# Patient Record
Sex: MALE | Race: BLACK OR AFRICAN AMERICAN | NOT HISPANIC OR LATINO | URBAN - METROPOLITAN AREA
[De-identification: names, ages, dates, MRNs, and addresses within clinical notes are randomized per-mention and may not be internally consistent; named-entity substitution may affect disease eponyms.]

---

## 2023-11-06 ENCOUNTER — INPATIENT (INPATIENT)
Facility: HOSPITAL | Age: 33
LOS: 1 days | Discharge: HOME HEALTH | DRG: 988 | End: 2023-11-08
Attending: STUDENT IN AN ORGANIZED HEALTH CARE EDUCATION/TRAINING PROGRAM | Admitting: STUDENT IN AN ORGANIZED HEALTH CARE EDUCATION/TRAINING PROGRAM
Payer: MEDICAID

## 2023-11-06 ENCOUNTER — EMERGENCY (EMERGENCY)
Facility: HOSPITAL | Age: 33
LOS: 1 days | Discharge: HOME | End: 2023-11-06
Attending: EMERGENCY MEDICINE | Admitting: EMERGENCY MEDICINE
Payer: MEDICAID

## 2023-11-06 VITALS
SYSTOLIC BLOOD PRESSURE: 130 MMHG | HEART RATE: 89 BPM | OXYGEN SATURATION: 99 % | TEMPERATURE: 98 F | WEIGHT: 154.98 LBS | RESPIRATION RATE: 18 BRPM | DIASTOLIC BLOOD PRESSURE: 100 MMHG

## 2023-11-06 VITALS
DIASTOLIC BLOOD PRESSURE: 59 MMHG | OXYGEN SATURATION: 98 % | SYSTOLIC BLOOD PRESSURE: 107 MMHG | HEART RATE: 70 BPM | TEMPERATURE: 99 F | RESPIRATION RATE: 18 BRPM

## 2023-11-06 VITALS
DIASTOLIC BLOOD PRESSURE: 55 MMHG | HEART RATE: 73 BPM | SYSTOLIC BLOOD PRESSURE: 127 MMHG | OXYGEN SATURATION: 97 % | RESPIRATION RATE: 18 BRPM | TEMPERATURE: 98 F

## 2023-11-06 DIAGNOSIS — N49.2 INFLAMMATORY DISORDERS OF SCROTUM: ICD-10-CM

## 2023-11-06 DIAGNOSIS — Z21 ASYMPTOMATIC HUMAN IMMUNODEFICIENCY VIRUS [HIV] INFECTION STATUS: ICD-10-CM

## 2023-11-06 DIAGNOSIS — M79.89 OTHER SPECIFIED SOFT TISSUE DISORDERS: ICD-10-CM

## 2023-11-06 DIAGNOSIS — L02.91 CUTANEOUS ABSCESS, UNSPECIFIED: ICD-10-CM

## 2023-11-06 DIAGNOSIS — Z29.9 ENCOUNTER FOR PROPHYLACTIC MEASURES, UNSPECIFIED: ICD-10-CM

## 2023-11-06 DIAGNOSIS — B20 HUMAN IMMUNODEFICIENCY VIRUS [HIV] DISEASE: ICD-10-CM

## 2023-11-06 LAB
ALBUMIN SERPL ELPH-MCNC: 3.5 G/DL — SIGNIFICANT CHANGE UP (ref 3.4–5)
ALBUMIN SERPL ELPH-MCNC: 3.5 G/DL — SIGNIFICANT CHANGE UP (ref 3.4–5)
ALP SERPL-CCNC: 65 U/L — SIGNIFICANT CHANGE UP (ref 40–120)
ALP SERPL-CCNC: 65 U/L — SIGNIFICANT CHANGE UP (ref 40–120)
ALT FLD-CCNC: 18 U/L — SIGNIFICANT CHANGE UP (ref 12–42)
ALT FLD-CCNC: 18 U/L — SIGNIFICANT CHANGE UP (ref 12–42)
ANION GAP SERPL CALC-SCNC: 9 MMOL/L — SIGNIFICANT CHANGE UP (ref 9–16)
ANION GAP SERPL CALC-SCNC: 9 MMOL/L — SIGNIFICANT CHANGE UP (ref 9–16)
APPEARANCE UR: CLEAR — SIGNIFICANT CHANGE UP
APPEARANCE UR: CLEAR — SIGNIFICANT CHANGE UP
APTT BLD: 31.3 SEC — SIGNIFICANT CHANGE UP (ref 24.5–35.6)
APTT BLD: 31.3 SEC — SIGNIFICANT CHANGE UP (ref 24.5–35.6)
AST SERPL-CCNC: 22 U/L — SIGNIFICANT CHANGE UP (ref 15–37)
AST SERPL-CCNC: 22 U/L — SIGNIFICANT CHANGE UP (ref 15–37)
BILIRUB DIRECT SERPL-MCNC: <0.1 MG/DL — SIGNIFICANT CHANGE UP (ref 0–0.3)
BILIRUB DIRECT SERPL-MCNC: <0.1 MG/DL — SIGNIFICANT CHANGE UP (ref 0–0.3)
BILIRUB INDIRECT FLD-MCNC: >0 MG/DL — LOW (ref 0.2–1)
BILIRUB INDIRECT FLD-MCNC: >0 MG/DL — LOW (ref 0.2–1)
BILIRUB SERPL-MCNC: 0.1 MG/DL — LOW (ref 0.2–1.2)
BILIRUB SERPL-MCNC: 0.1 MG/DL — LOW (ref 0.2–1.2)
BILIRUB UR-MCNC: NEGATIVE — SIGNIFICANT CHANGE UP
BILIRUB UR-MCNC: NEGATIVE — SIGNIFICANT CHANGE UP
BLD GP AB SCN SERPL QL: NEGATIVE — SIGNIFICANT CHANGE UP
BLD GP AB SCN SERPL QL: NEGATIVE — SIGNIFICANT CHANGE UP
BUN SERPL-MCNC: 9 MG/DL — SIGNIFICANT CHANGE UP (ref 7–23)
BUN SERPL-MCNC: 9 MG/DL — SIGNIFICANT CHANGE UP (ref 7–23)
CALCIUM SERPL-MCNC: 9.2 MG/DL — SIGNIFICANT CHANGE UP (ref 8.5–10.5)
CALCIUM SERPL-MCNC: 9.2 MG/DL — SIGNIFICANT CHANGE UP (ref 8.5–10.5)
CHLORIDE SERPL-SCNC: 105 MMOL/L — SIGNIFICANT CHANGE UP (ref 96–108)
CHLORIDE SERPL-SCNC: 105 MMOL/L — SIGNIFICANT CHANGE UP (ref 96–108)
CO2 SERPL-SCNC: 31 MMOL/L — SIGNIFICANT CHANGE UP (ref 22–31)
CO2 SERPL-SCNC: 31 MMOL/L — SIGNIFICANT CHANGE UP (ref 22–31)
COLOR SPEC: YELLOW — SIGNIFICANT CHANGE UP
COLOR SPEC: YELLOW — SIGNIFICANT CHANGE UP
CREAT SERPL-MCNC: 1.06 MG/DL — SIGNIFICANT CHANGE UP (ref 0.5–1.3)
CREAT SERPL-MCNC: 1.06 MG/DL — SIGNIFICANT CHANGE UP (ref 0.5–1.3)
CRP SERPL-MCNC: 20.1 MG/L — HIGH (ref 0–4)
CRP SERPL-MCNC: 20.1 MG/L — HIGH (ref 0–4)
DIFF PNL FLD: NEGATIVE — SIGNIFICANT CHANGE UP
DIFF PNL FLD: NEGATIVE — SIGNIFICANT CHANGE UP
EGFR: 96 ML/MIN/1.73M2 — SIGNIFICANT CHANGE UP
EGFR: 96 ML/MIN/1.73M2 — SIGNIFICANT CHANGE UP
ERYTHROCYTE [SEDIMENTATION RATE] IN BLOOD: 26 MM/HR — HIGH
ERYTHROCYTE [SEDIMENTATION RATE] IN BLOOD: 26 MM/HR — HIGH
GLUCOSE SERPL-MCNC: 95 MG/DL — SIGNIFICANT CHANGE UP (ref 70–99)
GLUCOSE SERPL-MCNC: 95 MG/DL — SIGNIFICANT CHANGE UP (ref 70–99)
GLUCOSE UR QL: NEGATIVE MG/DL — SIGNIFICANT CHANGE UP
GLUCOSE UR QL: NEGATIVE MG/DL — SIGNIFICANT CHANGE UP
GRAM STN FLD: ABNORMAL
HCT VFR BLD CALC: 38.7 % — LOW (ref 39–50)
HCT VFR BLD CALC: 38.7 % — LOW (ref 39–50)
HGB BLD-MCNC: 13.1 G/DL — SIGNIFICANT CHANGE UP (ref 13–17)
HGB BLD-MCNC: 13.1 G/DL — SIGNIFICANT CHANGE UP (ref 13–17)
INR BLD: 1.07 — SIGNIFICANT CHANGE UP (ref 0.85–1.18)
INR BLD: 1.07 — SIGNIFICANT CHANGE UP (ref 0.85–1.18)
KETONES UR-MCNC: NEGATIVE MG/DL — SIGNIFICANT CHANGE UP
KETONES UR-MCNC: NEGATIVE MG/DL — SIGNIFICANT CHANGE UP
LACTATE SERPL-SCNC: 0.6 MMOL/L — SIGNIFICANT CHANGE UP (ref 0.5–2)
LACTATE SERPL-SCNC: 0.6 MMOL/L — SIGNIFICANT CHANGE UP (ref 0.5–2)
LEUKOCYTE ESTERASE UR-ACNC: NEGATIVE — SIGNIFICANT CHANGE UP
LEUKOCYTE ESTERASE UR-ACNC: NEGATIVE — SIGNIFICANT CHANGE UP
MCHC RBC-ENTMCNC: 32 PG — SIGNIFICANT CHANGE UP (ref 27–34)
MCHC RBC-ENTMCNC: 32 PG — SIGNIFICANT CHANGE UP (ref 27–34)
MCHC RBC-ENTMCNC: 33.9 GM/DL — SIGNIFICANT CHANGE UP (ref 32–36)
MCHC RBC-ENTMCNC: 33.9 GM/DL — SIGNIFICANT CHANGE UP (ref 32–36)
MCV RBC AUTO: 94.6 FL — SIGNIFICANT CHANGE UP (ref 80–100)
MCV RBC AUTO: 94.6 FL — SIGNIFICANT CHANGE UP (ref 80–100)
NITRITE UR-MCNC: NEGATIVE — SIGNIFICANT CHANGE UP
NITRITE UR-MCNC: NEGATIVE — SIGNIFICANT CHANGE UP
NRBC # BLD: 0 /100 WBCS — SIGNIFICANT CHANGE UP (ref 0–0)
NRBC # BLD: 0 /100 WBCS — SIGNIFICANT CHANGE UP (ref 0–0)
PH UR: 5.5 — SIGNIFICANT CHANGE UP (ref 5–8)
PH UR: 5.5 — SIGNIFICANT CHANGE UP (ref 5–8)
PLATELET # BLD AUTO: 273 K/UL — SIGNIFICANT CHANGE UP (ref 150–400)
PLATELET # BLD AUTO: 273 K/UL — SIGNIFICANT CHANGE UP (ref 150–400)
POTASSIUM SERPL-MCNC: 3.4 MMOL/L — LOW (ref 3.5–5.3)
POTASSIUM SERPL-MCNC: 3.4 MMOL/L — LOW (ref 3.5–5.3)
POTASSIUM SERPL-SCNC: 3.4 MMOL/L — LOW (ref 3.5–5.3)
POTASSIUM SERPL-SCNC: 3.4 MMOL/L — LOW (ref 3.5–5.3)
PROT SERPL-MCNC: 7.8 G/DL — SIGNIFICANT CHANGE UP (ref 6.4–8.2)
PROT SERPL-MCNC: 7.8 G/DL — SIGNIFICANT CHANGE UP (ref 6.4–8.2)
PROT UR-MCNC: SIGNIFICANT CHANGE UP MG/DL
PROT UR-MCNC: SIGNIFICANT CHANGE UP MG/DL
PROTHROM AB SERPL-ACNC: 12.2 SEC — SIGNIFICANT CHANGE UP (ref 9.5–13)
PROTHROM AB SERPL-ACNC: 12.2 SEC — SIGNIFICANT CHANGE UP (ref 9.5–13)
RBC # BLD: 4.09 M/UL — LOW (ref 4.2–5.8)
RBC # BLD: 4.09 M/UL — LOW (ref 4.2–5.8)
RBC # FLD: 12.9 % — SIGNIFICANT CHANGE UP (ref 10.3–14.5)
RBC # FLD: 12.9 % — SIGNIFICANT CHANGE UP (ref 10.3–14.5)
RH IG SCN BLD-IMP: POSITIVE — SIGNIFICANT CHANGE UP
SODIUM SERPL-SCNC: 145 MMOL/L — SIGNIFICANT CHANGE UP (ref 132–145)
SODIUM SERPL-SCNC: 145 MMOL/L — SIGNIFICANT CHANGE UP (ref 132–145)
SP GR SPEC: >1.03 — HIGH (ref 1–1.03)
SP GR SPEC: >1.03 — HIGH (ref 1–1.03)
SPECIMEN SOURCE: SIGNIFICANT CHANGE UP
UROBILINOGEN FLD QL: 0.2 MG/DL — SIGNIFICANT CHANGE UP (ref 0.2–1)
UROBILINOGEN FLD QL: 0.2 MG/DL — SIGNIFICANT CHANGE UP (ref 0.2–1)
WBC # BLD: 7.27 K/UL — SIGNIFICANT CHANGE UP (ref 3.8–10.5)
WBC # BLD: 7.27 K/UL — SIGNIFICANT CHANGE UP (ref 3.8–10.5)
WBC # FLD AUTO: 7.27 K/UL — SIGNIFICANT CHANGE UP (ref 3.8–10.5)
WBC # FLD AUTO: 7.27 K/UL — SIGNIFICANT CHANGE UP (ref 3.8–10.5)

## 2023-11-06 PROCEDURE — 99285 EMERGENCY DEPT VISIT HI MDM: CPT

## 2023-11-06 PROCEDURE — 93010 ELECTROCARDIOGRAM REPORT: CPT

## 2023-11-06 PROCEDURE — 99223 1ST HOSP IP/OBS HIGH 75: CPT | Mod: GC

## 2023-11-06 PROCEDURE — 99053 MED SERV 10PM-8AM 24 HR FAC: CPT

## 2023-11-06 PROCEDURE — 72193 CT PELVIS W/DYE: CPT | Mod: 26

## 2023-11-06 RX ORDER — ACETAMINOPHEN 500 MG
650 TABLET ORAL EVERY 6 HOURS
Refills: 0 | Status: DISCONTINUED | OUTPATIENT
Start: 2023-11-06 | End: 2023-11-08

## 2023-11-06 RX ORDER — CIPROFLOXACIN LACTATE 400MG/40ML
400 VIAL (ML) INTRAVENOUS EVERY 12 HOURS
Refills: 0 | Status: DISCONTINUED | OUTPATIENT
Start: 2023-11-06 | End: 2023-11-08

## 2023-11-06 RX ORDER — IBUPROFEN 200 MG
600 TABLET ORAL EVERY 6 HOURS
Refills: 0 | Status: DISCONTINUED | OUTPATIENT
Start: 2023-11-06 | End: 2023-11-06

## 2023-11-06 RX ORDER — IBUPROFEN 200 MG
400 TABLET ORAL EVERY 6 HOURS
Refills: 0 | Status: DISCONTINUED | OUTPATIENT
Start: 2023-11-06 | End: 2023-11-08

## 2023-11-06 RX ORDER — METRONIDAZOLE 500 MG
500 TABLET ORAL ONCE
Refills: 0 | Status: DISCONTINUED | OUTPATIENT
Start: 2023-11-06 | End: 2023-11-09

## 2023-11-06 RX ORDER — BICTEGRAVIR SODIUM, EMTRICITABINE, AND TENOFOVIR ALAFENAMIDE FUMARATE 30; 120; 15 MG/1; MG/1; MG/1
1 TABLET ORAL DAILY
Refills: 0 | Status: DISCONTINUED | OUTPATIENT
Start: 2023-11-06 | End: 2023-11-06

## 2023-11-06 RX ORDER — SODIUM CHLORIDE 9 MG/ML
1000 INJECTION INTRAMUSCULAR; INTRAVENOUS; SUBCUTANEOUS ONCE
Refills: 0 | Status: COMPLETED | OUTPATIENT
Start: 2023-11-06 | End: 2023-11-06

## 2023-11-06 RX ORDER — METRONIDAZOLE 500 MG
500 TABLET ORAL ONCE
Refills: 0 | Status: COMPLETED | OUTPATIENT
Start: 2023-11-06 | End: 2023-11-06

## 2023-11-06 RX ORDER — BICTEGRAVIR SODIUM, EMTRICITABINE, AND TENOFOVIR ALAFENAMIDE FUMARATE 30; 120; 15 MG/1; MG/1; MG/1
1 TABLET ORAL EVERY 24 HOURS
Refills: 0 | Status: DISCONTINUED | OUTPATIENT
Start: 2023-11-06 | End: 2023-11-08

## 2023-11-06 RX ORDER — METRONIDAZOLE 500 MG
TABLET ORAL
Refills: 0 | Status: DISCONTINUED | OUTPATIENT
Start: 2023-11-06 | End: 2023-11-07

## 2023-11-06 RX ORDER — CEFTRIAXONE 500 MG/1
1000 INJECTION, POWDER, FOR SOLUTION INTRAMUSCULAR; INTRAVENOUS ONCE
Refills: 0 | Status: COMPLETED | OUTPATIENT
Start: 2023-11-06 | End: 2023-11-06

## 2023-11-06 RX ORDER — VANCOMYCIN HCL 1 G
VIAL (EA) INTRAVENOUS
Refills: 0 | Status: DISCONTINUED | OUTPATIENT
Start: 2023-11-06 | End: 2023-11-06

## 2023-11-06 RX ORDER — BICTEGRAVIR SODIUM, EMTRICITABINE, AND TENOFOVIR ALAFENAMIDE FUMARATE 30; 120; 15 MG/1; MG/1; MG/1
1 TABLET ORAL
Refills: 0 | DISCHARGE

## 2023-11-06 RX ORDER — VANCOMYCIN HCL 1 G
1000 VIAL (EA) INTRAVENOUS ONCE
Refills: 0 | Status: COMPLETED | OUTPATIENT
Start: 2023-11-06 | End: 2023-11-06

## 2023-11-06 RX ORDER — ACETAMINOPHEN 500 MG
1000 TABLET ORAL ONCE
Refills: 0 | Status: COMPLETED | OUTPATIENT
Start: 2023-11-06 | End: 2023-11-06

## 2023-11-06 RX ORDER — POTASSIUM CHLORIDE 20 MEQ
40 PACKET (EA) ORAL ONCE
Refills: 0 | Status: COMPLETED | OUTPATIENT
Start: 2023-11-06 | End: 2023-11-06

## 2023-11-06 RX ORDER — KETOROLAC TROMETHAMINE 30 MG/ML
15 SYRINGE (ML) INJECTION ONCE
Refills: 0 | Status: DISCONTINUED | OUTPATIENT
Start: 2023-11-06 | End: 2023-11-06

## 2023-11-06 RX ORDER — VANCOMYCIN HCL 1 G
1000 VIAL (EA) INTRAVENOUS EVERY 12 HOURS
Refills: 0 | Status: DISCONTINUED | OUTPATIENT
Start: 2023-11-07 | End: 2023-11-07

## 2023-11-06 RX ORDER — VALACYCLOVIR 500 MG/1
1000 TABLET, FILM COATED ORAL ONCE
Refills: 0 | Status: COMPLETED | OUTPATIENT
Start: 2023-11-06 | End: 2023-11-06

## 2023-11-06 RX ORDER — INFLUENZA VIRUS VACCINE 15; 15; 15; 15 UG/.5ML; UG/.5ML; UG/.5ML; UG/.5ML
0.5 SUSPENSION INTRAMUSCULAR ONCE
Refills: 0 | Status: DISCONTINUED | OUTPATIENT
Start: 2023-11-06 | End: 2023-11-08

## 2023-11-06 RX ORDER — METRONIDAZOLE 500 MG
500 TABLET ORAL EVERY 8 HOURS
Refills: 0 | Status: DISCONTINUED | OUTPATIENT
Start: 2023-11-06 | End: 2023-11-07

## 2023-11-06 RX ADMIN — Medication 1 TABLET(S): at 22:20

## 2023-11-06 RX ADMIN — Medication 40 MILLIEQUIVALENT(S): at 11:14

## 2023-11-06 RX ADMIN — Medication 200 MILLIGRAM(S): at 15:46

## 2023-11-06 RX ADMIN — Medication 1000 MILLIGRAM(S): at 11:56

## 2023-11-06 RX ADMIN — Medication 15 MILLIGRAM(S): at 12:57

## 2023-11-06 RX ADMIN — BICTEGRAVIR SODIUM, EMTRICITABINE, AND TENOFOVIR ALAFENAMIDE FUMARATE 1 TABLET(S): 30; 120; 15 TABLET ORAL at 22:20

## 2023-11-06 RX ADMIN — VALACYCLOVIR 1000 MILLIGRAM(S): 500 TABLET, FILM COATED ORAL at 05:53

## 2023-11-06 RX ADMIN — SODIUM CHLORIDE 1000 MILLILITER(S): 9 INJECTION INTRAMUSCULAR; INTRAVENOUS; SUBCUTANEOUS at 11:16

## 2023-11-06 RX ADMIN — Medication 400 MILLIGRAM(S): at 21:30

## 2023-11-06 RX ADMIN — Medication 250 MILLIGRAM(S): at 18:36

## 2023-11-06 RX ADMIN — Medication 100 MILLIGRAM(S): at 17:32

## 2023-11-06 RX ADMIN — Medication 15 MILLIGRAM(S): at 13:27

## 2023-11-06 RX ADMIN — Medication 100 MILLIGRAM(S): at 22:20

## 2023-11-06 RX ADMIN — CEFTRIAXONE 100 MILLIGRAM(S): 500 INJECTION, POWDER, FOR SOLUTION INTRAMUSCULAR; INTRAVENOUS at 08:59

## 2023-11-06 RX ADMIN — Medication 400 MILLIGRAM(S): at 20:26

## 2023-11-06 RX ADMIN — Medication 400 MILLIGRAM(S): at 11:16

## 2023-11-06 NOTE — H&P ADULT - PROBLEM SELECTOR PLAN 1
#Scrotal/intergluteal abscess   -Transferred from [facility] ED for scrotal/intergluteal abscesses.   -Reports severe pain, purulent drainage from perianal region as well as R scrotum for the past wekk.  Recently seen at [facility] last week, found to have scrotal lesion and sent home with outpatient  follow up which he was unable to do. Sexually active with men and women, engages in receptive anal intercourse.   -In the ED: Aferbile, no leukocytosis, UA negative for infection, non-toxic appearing. CT of pelvis shows:  Bilateral intergluteal fold abscesses. No soft tissue gas. Symmetric testicular size and enhancement. No  significant scrotal hydrocele.   -s/p IV ctx/flagyl (at [facility]), bedside I&D performed for scrotal and gluteal abscesses by urology/surgery    Plan:   -f/u blood cultures  -f/u wound cultures #Scrotal/intergluteal abscess   -Transferred from [facility] ED for scrotal/intergluteal abscesses.   -Reports severe pain, purulent drainage from perianal region as well as R scrotum for the past wekk.  Recently seen at [facility] last week, found to have scrotal lesion and sent home with outpatient  follow up which he was unable to do. Sexually active with men and women, engages in receptive anal intercourse.   -In the ED: Aferbile, no leukocytosis, UA negative for infection, non-toxic appearing. CT of pelvis shows:  Bilateral intergluteal fold abscesses. Additional finding of Right scrotal wall abscess, No soft tissue gas. Symmetric testicular size and enhancement. No  significant scrotal hydrocele.   -s/p IV ctx/flagyl (at [facility]), bedside I&D performed for scrotal and gluteal abscesses by urology/surgery    Plan:   -f/u blood cultures  -f/u wound cultures #Scrotal/intergluteal abscess   -Transferred from [facility] ED for scrotal/intergluteal abscesses.   -Reports severe pain, purulent drainage from perianal region as well as R scrotum for the past week.  Recently seen at [facility] last week, found to have scrotal lesion and sent home with outpatient  follow up which he was unable to do. Sexually active with men and women, engages in receptive anal intercourse.   -In the ED: Aferbile, no leukocytosis, UA negative for infection, non-toxic appearing. CT of pelvis shows:  Bilateral intergluteal fold abscesses. Additional finding of Right scrotal wall abscess, No soft tissue gas.   -s/p IV ctx/flagyl (at [facility]), bedside I&D performed for scrotal and gluteal abscesses by urology/surgery    Plan:   -Ciprofloxacin 400mg IV BID (to cover gram-negative)  -Metronidazole 500mg IV q8hr (to cover anaerobes)  -Vancomycin (to cover for MRSA given purulence)   -Pain control with Tylenol 650mg P.O PRN   -f/u blood cultures  -f/u wound cultures #Scrotal/intergluteal abscess   -Transferred from [facility] ED for scrotal/intergluteal abscesses.   -Reports severe pain, purulent drainage from perianal region as well as R scrotum for the past week.  Recently seen at [facility] last week, found to have scrotal lesion and sent home with outpatient  follow up which he was unable to do. Sexually active with men and women, engages in receptive anal intercourse. Denies recent sexual activity, trauma to the groin   -In the ED: Aferbile, no leukocytosis, UA negative for infection, non-toxic appearing. CT of pelvis shows:  Bilateral intergluteal fold abscesses. Additional finding of Right scrotal wall abscess, No soft tissue gas.   -s/p IV ctx/flagyl (at [facility]), bedside I&D performed for scrotal and gluteal abscesses by urology/surgery    Plan:   -Ciprofloxacin 400mg IV BID (to cover gram-negative)  -Metronidazole 500mg IV q8hr (to cover anaerobes)  -Vancomycin (to cover for MRSA given purulence)   -Pain control with Tylenol 650mg P.O PRN   -f/u blood cultures  -f/u wound cultures #Scrotal/intergluteal abscess   -Transferred from [facility] ED for scrotal/intergluteal abscesses.   -Reports severe pain, purulent drainage from perianal region as well as R scrotum for the past week.  Recently seen at [facility] last week, found to have scrotal lesion and sent home with outpatient  follow up which he was unable to do. Sexually active with men and women, engages in receptive anal intercourse. Denies recent sexual activity, trauma to the groin   -In the ED: Aferbile, no leukocytosis, UA negative for infection, non-toxic appearing. CT of pelvis shows:  Bilateral intergluteal fold abscesses. Additional finding of Right scrotal wall abscess, No soft tissue gas.   -s/p IV ctx/flagyl (at [facility]), bedside I&D performed for scrotal and gluteal abscesses by urology/surgery    Plan:   -Ciprofloxacin 400mg IV BID (to cover gram-negative)  -Metronidazole 500mg IV q8hr (to cover anaerobes)  -Vancomycin 15mg/kg 1000mg IV BID (to cover for MRSA given purulence)   -Pain control with Tylenol 650mg P.O PRN   -f/u blood cultures  -f/u wound cultures #Scrotal/intergluteal abscess   -Transferred from [facility] ED for scrotal/intergluteal abscesses.   -Reports severe pain, purulent drainage from perianal region as well as R scrotum for the past week.  Recently seen at [facility] last week, found to have scrotal lesion and sent home with outpatient  follow up which he was unable to do. Sexually active with men and women, engages in receptive anal intercourse. Denies recent sexual activity, trauma to the groin   -In the ED: Aferbile, no leukocytosis, UA negative for infection, non-toxic appearing. CT of pelvis shows:  Bilateral intergluteal fold abscesses. Additional finding of Right scrotal wall abscess, No soft tissue gas.   -s/p IV ctx/flagyl (at [facility]), bedside I&D performed for scrotal and gluteal abscesses by urology/surgery    Plan:   -Ciprofloxacin 400mg IV BID (to cover gram-negative)  -Metronidazole 500mg IV q8hr (to cover anaerobes)  -Vancomycin 15mg/kg 1000mg IV BID (to cover for MRSA given purulence)   -Pain control with Tylenol 650mg P.O PRN   -f/u blood cultures  -f/u wound cultures  -f/u ESR/CRP #Scrotal/intergluteal abscess   -Transferred from [facility] ED for scrotal/intergluteal abscesses.   -Reports severe pain, purulent drainage from perianal region as well as R scrotum for the past week.  Recently seen at [facility] last week, found to have scrotal lesion and sent home with outpatient  follow up which he was unable to do. Sexually active with men and women, engages in receptive anal intercourse. Denies recent sexual activity, trauma to the groin   -In the ED: Aferbile, no leukocytosis, UA negative for infection, non-toxic appearing. CT of pelvis shows:  Bilateral intergluteal fold abscesses. Additional finding of Right scrotal wall abscess, No soft tissue gas.   -s/p IV ctx/flagyl (at [facility]), bedside I&D performed for scrotal and gluteal abscesses by urology/surgery. As per surgery: No further general surgery procedure planned     Plan:   -Ciprofloxacin 400mg IV BID (to cover gram-negative)  -Metronidazole 500mg IV q8hr (to cover anaerobes)  -Vancomycin 15mg/kg 1000mg IV BID (to cover for MRSA given purulence)   -Pain control with Tylenol 650mg P.O PRN   -f/u blood cultures  -f/u wound cultures  -Sitz baths  -f/u surgery  and urology recs   -f/u ESR/CRP #Scrotal/intergluteal abscess   -Transferred from [facility] ED for scrotal/intergluteal abscesses.   -Reports severe pain, purulent drainage from perianal region as well as R scrotum for the past week.  Recently seen at [facility] last week, found to have scrotal lesion and sent home with outpatient  follow up which he was unable to do. Sexually active with men and women, engages in receptive anal intercourse. Denies recent sexual activity, trauma to the groin   -In the ED: Aferbile, no leukocytosis, UA negative for infection, non-toxic appearing. CT of pelvis shows:  Bilateral intergluteal fold abscesses. Additional finding of Right scrotal wall abscess, No soft tissue gas.   -s/p IV ctx/flagyl (at [facility]), bedside I&D performed for scrotal and gluteal abscesses by urology/surgery. As per surgery: No further general surgery procedure planned     Plan:   -Ciprofloxacin 400mg IV BID (to cover gram-negative)  -Metronidazole 500mg IV q8hr (to cover anaerobes)  -Vancomycin 15mg/kg 1000mg IV BID (to cover for MRSA given purulence)   -Pain control with Tylenol 650mg P.O PRN for mild and moderate pain, Ibuprofen 600mg P.O q6 for severe pain  -f/u blood cultures  -f/u wound cultures  -Flores baths  -f/u surgery  and urology recs   -f/u ESR/CRP

## 2023-11-06 NOTE — ED PROVIDER NOTE - CLINICAL SUMMARY MEDICAL DECISION MAKING FREE TEXT BOX
33 year old male with history of HIV on Biktarvy (reports last viral load undetectable) presenting as transfer from [facility] ED for scrotal/intergluteal abscesses. 33 year old male with history of HIV on Biktarvy (reports last viral load undetectable) presenting as transfer from [facility] ED for scrotal/intergluteal abscesses x ~1 wk, overall nontoxic/afebrile here, vitals wnl, labs reviewed from OSH, overall reassuring/no leukocytosis. Will send remainder of STD screen given high risk. No clinical evidence of fourniers vs nec fasc. Surgery/urology to eval for likely drainage of abscesses.

## 2023-11-06 NOTE — H&P ADULT - PROBLEM SELECTOR PLAN 3
F: None  Replete: Mg<2, Ph<3, K<4  Diet: Regular  Code status: Full Code  Dispo: [facility]

## 2023-11-06 NOTE — PROCEDURE NOTE - NSINFORMCONSENT_GEN_A_CORE
in conjunction with URO/Benefits, risks, and possible complications of procedure explained to patient/caregiver who verbalized understanding and gave verbal consent.

## 2023-11-06 NOTE — ED PROVIDER NOTE - PHYSICAL EXAMINATION
Gen - NAD; well-appearing; A+Ox3   HEENT - NCAT, EOMI  Neck - supple  Resp - CTAB, no increased WOB  CV -  RRR, no m/r/g  Abd - soft, NT, ND; no guarding or rebound  Extrem - no LE edema  Neuro - no focal motor or sensation deficits  Skin - warm, well perfused   - +R scrotal wall abscess, tender to touch, purulent, no crepitus; +multiple intergluteal fold abscesses with fluctuance/purulence, tender to touch, no crepitus

## 2023-11-06 NOTE — CONSULT NOTE ADULT - SUBJECTIVE AND OBJECTIVE BOX
SURGERY CONSULT  ==============================================================================================================  HPI: 33 year old male with history of HIV on Biktarvy (reports last viral load undetectable) presenting as transfer from [facility] ED for scrotal/intergluteal abscesses. States for past week he has had severe pain and purulent drainage from perianal region as well as R scrotum--denies fevers or chills. Denies any abscesses or drainage procedures in the past. Denies any change in bowel movements.     In the ED patient seen with complaints of buttock pain. Vital signs within normal limits, afebrile, saturating appropriately RA.   Rectal exam shows purulent drainage, mostly from left sided abscess in the intergluteal fold, some erythema noted. Right side also presents with a pustule with overlying fluctuance, no drainage, exquisitely tender to palpation. Multiple anal skin tags evidenced. No internal or external hemorrhoids.   CT pelvis shows Bilateral intergluteal fold abscesses, on the right 3.0 x 1.5 x 4.0 cm, on the left 2.2 x 1.5 x 2.0 cm. No soft tissue gas. Right scrotal wall abscess 2.0 x 1.6 x 2.0cm  General surgery consulted for evaluation for incision and drainage of bilateral intergluteal abscesses.       PAST MEDICAL & SURGICAL HISTORY:  HIV disease  Herpes virus diseas  Home Meds: Home Medications:  Allergies: Allergies  No Known Allergies  Intolerances      Soc:   Advanced Directives: Presumed Full Code     CURRENT MEDICATIONS:   --------------------------------------------------------------------------------------  Neurologic Medications  ketorolac   Injectable 15 milliGRAM(s) IV Push Once    Respiratory Medications    Cardiovascular Medications    Gastrointestinal Medications    Genitourinary Medications    Hematologic/Oncologic Medications    Antimicrobial/Immunologic Medications  doxycycline IVPB 100 milliGRAM(s) IV Intermittent once    Endocrine/Metabolic Medications    Topical/Other Medications    --------------------------------------------------------------------------------------    VITAL SIGNS, INS/OUTS (last 24 hours):  --------------------------------------------------------------------------------------  ICU Vital Signs Last 24 Hrs  T(C): 36.7 (06 Nov 2023 10:06), Max: 37.2 (06 Nov 2023 09:05)  T(F): 98 (06 Nov 2023 10:06), Max: 98.9 (06 Nov 2023 09:05)  HR: 73 (06 Nov 2023 10:06) (70 - 89)  BP: 127/55 (06 Nov 2023 10:06) (107/59 - 130/100)  BP(mean): --  ABP: --  ABP(mean): --  RR: 18 (06 Nov 2023 10:06) (18 - 18)  SpO2: 97% (06 Nov 2023 10:06) (97% - 99%)    O2 Parameters below as of 06 Nov 2023 10:06  Patient On (Oxygen Delivery Method): room air    I&O's Summary    --------------------------------------------------------------------------------------    EXAM:  CONSTITUTIONAL: Awake, alert.  Nontoxic, no acute distress.  HEAD: Normocephalic, atraumatic.  EYES: Conjunctivae clear without exudates or hemorrhage. Sclera is non-icteric.  ENT: Normal appearing external ears, nose, mucous membranes moist.  NECK: supple, trachea midline.  HEART:  Normal rate, regular rhythm.    LUNGS:  No acute respiratory distress.  Non-tachypneic and non-labored.  ABDOMEN: Soft, non-distended.  No rebound or guarding. No hernias or masses palpable.  No pulsatile abdominal mass.   No CVA tenderness b/l.  MUSCULOSKELETAL:  Moving all extremities without issue.  SKIN: Skin in warm, dry and intact without rashes or lesions.  Appropriate color for ethnicity.  Rectal: Rectal exam shows purulent drainage, mostly from left sided abscess in the intergluteal fold, some erythema noted. Right side also presents with a pustule with overlying fluctuance, no drainage, exquisitely tender to palpation. Multiple anal skin tags evidenced. No internal or external hemorrhoids.   NEUROLOGICAL:  Patient is alert, oriented x person, place and time.  PSYCH: Appropriate mood and affect. Good judgment and insight.]    LABS  --------------------------------------------------------------------------------------  Labs:  CAPILLARY BLOOD GLUCOE             13.1   7.27  )-----------( 273      ( 06 Nov 2023 05:35 )             38.7         11-06    145  |  105  |  9   ----------------------------<  95  3.4<L>   |  31  |  1.06      Calcium: 9.2 mg/dL (11-06-23 @ 05:35)      LFTs:     Lactate, Blood: 0.6 mmol/L (11-06-23 @ 10:10)      Coags:     12.2   ----< 1.07    ( 06 Nov 2023 10:09 )     31.3        Urinalysis Basic - ( 06 Nov 2023 10:36 )    Color: Yellow / Appearance: Clear / SG: >1.030 / pH: x  Gluc: x / Ketone: Negative mg/dL  / Bili: Negative / Urobili: 0.2 mg/dL   Blood: x / Protein: Trace mg/dL / Nitrite: Negative   Leuk Esterase: Negative / RBC: x / WBC x   Sq Epi: x / Non Sq Epi: x / Bacteria: x        Urinalysis with Rflx Culture (collected 06 Nov 2023 10:36)    --------------------------------------------------------------------------------------    OTHER LABS    IMAGING RESULTS      ACC: [id number] EXAM:  CT PELVIS ONLY IC   ORDERED BY: PALUO ALICEA     *** ADDENDUM # 1 ***    ADDENDUM: Additional finding of Right scrotal wall abscess 2.0 x 1.6 x   2.0 cm, correlates to patient's signs and symptoms upon discussion with   Dr. PAULO ALICEA 11/6/2023 7:03 AM.    --- End of Report ---    *** END OF ADDENDUM # 1 ***      PROCEDURE DATE:  11/06/2023          INTERPRETATION:  CLINICAL INFORMATION: Scrotal pain and swelling    COMPARISON: None.    CONTRAST/COMPLICATIONS:  IV Contrast: Omnipaque 350  100 cc administered   0 cc discarded  Oral Contrast: NONE  Complications: None reported at time of study completion    PROCEDURE:  CT of the Pelvis was performed.  Sagittal and coronal reformats were performed.    FINDINGS:  BLADDER: Within normal limits.  REPRODUCTIVE ORGANS: Symmetric testicular size and enhancement. No   significant scrotal hydrocele. Unremarkable prostate.  LYMPH NODES: No pelvic lymphadenopathy.    VISUALIZED PORTIONS:  ABDOMINAL ORGANS: Within normal limits.  BOWEL: Within normal limits.  PERITONEUM: No ascites.  VESSELS: Within normal limits.  ABDOMINAL WALL: Bilateral intergluteal fold abscesses, on the right 3.0 x   1.5 x 4.0 cm, on the left 2.2 x 1.5 x 2.0 cm. No soft tissue gas.  BONES: Within normal limits.    IMPRESSION:  Bilateral intergluteal fold abscesses. Pelvic MRI could be obtained to   exclude underlying possibility of perianal fistula, if clinically   warranted.    ASSESSMENT/ PLAN:  33 year old male with history of HIV on Biktarvy (reports last viral load undetectable) presenting as transfer from [facility] ED for scrotal/intergluteal abscesses. As evidenced on CT scan bilateral intergluteal fold abscesses, on the right 3.0 x   1.5 x 4.0 cm, on the left 2.2 x 1.5 x 2.0 cm. No soft tissue gas.    Incision and drainage procedure performed bedside in the ED over the right sided abscess yielded 4-5 cc of purulent material, packed with sterile packing strips. Left sided abscess was found to be draining from multiple sinuses. Urology also performed Scrotal ID.  Cultures obtained. Pain control provided as well as antibiotics.     Plan     No further general surgery procedure planned   F/u cultures to rule out possible MRSA infection   Patient would most likely benefit from admission for IV antibiotics in the setting of HIV disease and multiple abscesses   Ensure adequate pain control   Team 4c to follow, please do not hesitate to call if any further questions or concerns   Attending Dr. Mesa aware and agrees with plan
33 year old male with history of HIV on Biktarvy (reports last viral load undetectable) presenting as transfer from [facility] ED for scrotal/intergluteal abscesses. States for past week he has had severe pain and purulent drainage from perianal region as well as R scrotum--denies fevers or chills. Sexually active with men and women, engages in receptive anal intercourse. Per pt, he was seen at [facility] last week, found to have scrotal lesion and sent home with outpatient  follow up which he was unable to do.      resident performed bedside scrotal incision and drainage using sterile technique. Scrotal wound cx sent x 2. packed and left open      Vital Signs Last 24 Hrs  T(C): 36.7 (06 Nov 2023 10:06), Max: 37.2 (06 Nov 2023 09:05)  T(F): 98 (06 Nov 2023 10:06), Max: 98.9 (06 Nov 2023 09:05)  HR: 73 (06 Nov 2023 10:06) (70 - 89)  BP: 127/55 (06 Nov 2023 10:06) (107/59 - 130/100)  BP(mean): --  RR: 18 (06 Nov 2023 10:06) (18 - 18)  SpO2: 97% (06 Nov 2023 10:06) (97% - 99%)    Parameters below as of 06 Nov 2023 10:06  Patient On (Oxygen Delivery Method): room air      I&O's Summary      PE:  Gen: awake and alert  Abd: nontender, nondistended  : no suprapubic/CVAT  R scrotum: + erythematous. + fluctuance + purulent drainage + exquisitely tender  L scrotum: wnl  no penile drainage, swelling, erythema     LABS:                        13.1   7.27  )-----------( 273      ( 06 Nov 2023 05:35 )             38.7     11-06    145  |  105  |  9   ----------------------------<  95  3.4<L>   |  31  |  1.06    Ca    9.2      06 Nov 2023 05:35      PT/INR - ( 06 Nov 2023 10:09 )   PT: 12.2 sec;   INR: 1.07          PTT - ( 06 Nov 2023 10:09 )  PTT:31.3 sec  Cultures      ADDENDUM: Additional finding of Right scrotal wall abscess 2.0 x 1.6 x   2.0 cm, correlates to patient's signs and symptoms upon discussion with   Dr. PAULO ALICEA 11/6/2023 7:03 AM.    --- End of Report ---    *** END OF ADDENDUM # 1 ***      PROCEDURE DATE:  11/06/2023          INTERPRETATION:  CLINICAL INFORMATION: Scrotal pain and swelling    COMPARISON: None.    CONTRAST/COMPLICATIONS:  IV Contrast: Omnipaque 350  100 cc administered   0 cc discarded  Oral Contrast: NONE  Complications: None reported at time of study completion    PROCEDURE:  CT of the Pelvis was performed.  Sagittal and coronal reformats were performed.    FINDINGS:  BLADDER: Within normal limits.  REPRODUCTIVE ORGANS: Symmetric testicular size and enhancement. No   significant scrotal hydrocele. Unremarkable prostate.  LYMPH NODES: No pelvic lymphadenopathy.    VISUALIZED PORTIONS:  ABDOMINAL ORGANS: Within normal limits.  BOWEL: Within normal limits.  PERITONEUM: No ascites.  VESSELS: Within normal limits.  ABDOMINAL WALL: Bilateral intergluteal fold abscesses, on the right 3.0 x   1.5 x 4.0 cm, on the left 2.2 x 1.5 x 2.0 cm. No soft tissue gas.  BONES: Within normal limits.    IMPRESSION:  Bilateral intergluteal fold abscesses. Pelvic MRI could be obtained to   exclude underlying possibility of perianal fistula, if clinically   warranted.

## 2023-11-06 NOTE — H&P ADULT - HISTORY OF PRESENT ILLNESS
33 year old male with history of HIV on Biktarvy (reports last viral load undetectable) presenting as transfer from [facility] ED for scrotal/intergluteal abscesses. States for past week he has had severe pain and purulent drainage from perianal region as well as R scrotum.  Of note, pt reports he was recently seen at [facility] last week, found to have scrotal lesion and sent home with outpatient  follow up which he was unable to do.Pt is  Sexually active with men and women, engages in receptive anal intercourse.  Pt denies fevers or chills.   Found at OSH to have R scrotal abscess as well as intergluteal fold abscesses on CT pelvis, given IV ctx/flagyl, sent to  ED for further eval/management. In the ED, patient seen by surgery and urology--bedside I&D performed for scrotal and gluteal abscesses    In the ED:  VS: T 98, HR 73, /55, RR 18, SPO2 97%  Labs:WBC 7.27. Hgb 13.1, Plt 273, Na 145, K 3.4, Bicarb 31, AG 9, BUN 9, creatinine 1.06, UA negative for LE, Nitrite   Imaging: CT pelvis: Bilateral intergluteal fold abscesses, on the right 3.0 x   1.5 x 4.0 cm, on the left 2.2 x 1.5 x 2.0 cm. No soft tissue gas. Pelvic MRI could be obtained to  exclude underlying possibility of perianal fistula, if clinically   warranted.  Interventions:   Acetaminophen 1g IV, Ketorocol 15mg IV, KCl 40mEq P.O x1, NS 1L IV  Consults: Patient seen by surgery and urology--bedside I&D performed for scrotal and gluteal abscesses 33 year old male with history of HIV on Biktarvy (reports last viral load undetectable) presenting as transfer from [facility] ED for scrotal/intergluteal abscesses. States for past week he has had severe pain and purulent drainage from perianal region as well as R scrotum.  Of note, pt reports he was recently seen at [facility] last week, found to have scrotal lesion and sent home with outpatient  follow up which he was unable to do.Pt is  Sexually active with men and women, engages in receptive anal intercourse.  Pt denies fevers or chills.   Found at OSH to have R scrotal abscess as well as intergluteal fold abscesses on CT pelvis, given IV ctx/flagyl, sent to  ED for further eval/management. In the ED, patient seen by surgery and urology--bedside I&D performed for scrotal and gluteal abscesses    In the ED:  VS: T 98, HR 73, /55, RR 18, SPO2 97%  Labs:WBC 7.27. Hgb 13.1, Plt 273, Na 145, K 3.4, Bicarb 31, AG 9, BUN 9, creatinine 1.06, UA negative for LE, Nitrite   Imaging: CT pelvis: Bilateral intergluteal fold abscesses, on the right 3.0 x   1.5 x 4.0 cm, on the left 2.2 x 1.5 x 2.0 cm. Additional finding of Right scrotal wall abscess 2.0 x 1.6 x   2.0 cm, No soft tissue gas. Pelvic MRI could be obtained to  exclude underlying possibility of perianal fistula, if clinically   warranted.  Interventions:   Acetaminophen 1g IV, Ketorocol 15mg IV, KCl 40mEq P.O x1, NS 1L IV  Consults: Patient seen by surgery and urology--bedside I&D performed for scrotal and gluteal abscesses 33 year old male with history of HIV on Biktarvy (reports last viral load undetectable) presenting as transfer from [facility] ED for scrotal/intergluteal abscesses. States for past week he has had severe pain and purulent drainage from perianal region as well as R scrotum.  Of note, pt reports he was recently seen at [facility] last week, found to have scrotal lesion and sent home with outpatient  follow up which he was unable to do. Reports he did not receive antibiotics at this time, and was not prescribed any medications. Pt is  Sexually active with men and women, engages in receptive anal intercourse. Denies recent sexual activity, denies trauma to the groin area or prior hx of infections in the groin area. Patient endorses night sweats over the last week. On ROS  denies fevers or chills, nausea, vomiting, suprapubic pain, dysuria, hematuria, hematochezia, diarrhea.   Pt initially presented at [facility], found to have R scrotal abscess as well as intergluteal fold abscesses on CT pelvis, given IV ctx/flagyl, sent to  ED for further eval/management. In the ED, patient seen by surgery and urology--bedside I&D performed for scrotal and gluteal abscesses    In the ED:  VS: T 98, HR 73, /55, RR 18, SPO2 97%  Labs:WBC 7.27. Hgb 13.1, Plt 273, Na 145, K 3.4, Bicarb 31, AG 9, BUN 9, creatinine 1.06, UA negative for LE, Nitrite   Imaging: CT pelvis: Bilateral intergluteal fold abscesses, on the right 3.0 x 1.5 x 4.0 cm, on the left 2.2 x 1.5 x 2.0 cm. Additional finding of Right scrotal wall abscess 2.0 x 1.6 x   2.0 cm, No soft tissue gas. Pelvic MRI could be obtained to  exclude underlying possibility of perianal fistula, if clinically warranted.  Interventions:   Acetaminophen 1g IV, Ketorocol 15mg IV, KCl 40mEq P.O x1, NS 1L IV  Consults: Patient seen by surgery and urology--bedside I&D performed for scrotal and gluteal abscesses

## 2023-11-06 NOTE — ED PROVIDER NOTE - PROGRESS NOTE DETAILS
Zia Moser MD: Patient seen by surgery and urology--bedside I&D performed for scrotal and gluteal abscesses--will admit for IV abx to include MRSA coverage and pain control

## 2023-11-06 NOTE — ED ADULT NURSE REASSESSMENT NOTE - NS ED NURSE REASSESS COMMENT FT1
Charge RN received report from [facility]. pt transferred from [facility] with EMS. a/ox4, VSS. received with Rocephin infusing. Safety maintained, all needs met.

## 2023-11-06 NOTE — H&P ADULT - NSHPLABSRESULTS_GEN_ALL_CORE
.  LABS:                         13.1   7.27  )-----------( 273      ( 06 Nov 2023 05:35 )             38.7     11-06    145  |  105  |  9   ----------------------------<  95  3.4<L>   |  31  |  1.06    Ca    9.2      06 Nov 2023 05:35      PT/INR - ( 06 Nov 2023 10:09 )   PT: 12.2 sec;   INR: 1.07          PTT - ( 06 Nov 2023 10:09 )  PTT:31.3 sec  Urinalysis Basic - ( 06 Nov 2023 10:36 )    Color: Yellow / Appearance: Clear / SG: >1.030 / pH: x  Gluc: x / Ketone: Negative mg/dL  / Bili: Negative / Urobili: 0.2 mg/dL   Blood: x / Protein: Trace mg/dL / Nitrite: Negative   Leuk Esterase: Negative / RBC: x / WBC x   Sq Epi: x / Non Sq Epi: x / Bacteria: x            Lactate, Blood: 0.6 mmol/L (11-06 @ 10:10)      RADIOLOGY, EKG & ADDITIONAL TESTS: Reviewed.

## 2023-11-06 NOTE — ED ADULT NURSE NOTE - OBJECTIVE STATEMENT
33 y.o. Male a/ox4 transferred via ambulance from [facility] c/o testicular pain. Dx scrotal & gluteal abscess. Was supposed to follow up outpatient with  MD but was unable to do so. Arrived with L #20G IV In AC with IV Rocephin in progress. Denies CP, SOB, fevers/chills, abd pain nvd, headaches, syncope.

## 2023-11-06 NOTE — ED PROVIDER NOTE - PHYSICAL EXAMINATION
VITAL SIGNS: I have reviewed nursing notes and confirm.  CONSTITUTIONAL: Well-developed; well-nourished; in no acute distress.  HEAD: Normocephalic; atraumatic.  EYES: EOM intact; conjunctiva and sclera clear.  ENT: nose appears normal  ABD: soft; non-distended; non-tender  : focal swelling of the right scrotum with evidence of recent drainage. small ulcerations to the perianal region with serous drainage  PSYCH: Cooperative, appropriate.

## 2023-11-06 NOTE — H&P ADULT - ASSESSMENT
33 year old male with history of HIV on Biktarvy (reports last viral load undetectable) presenting as transfer from [facility] ED with 1 week hx of pain and drainage in the groin area found to have scrotal/intergluteal abscesses admitted for I&D and treatment with IV antibiotics

## 2023-11-06 NOTE — H&P ADULT - PROBLEM SELECTOR PLAN 2
Reports history of HIV on Biktarvy (reports last viral load undetectable)    Plan:   -f/u viral load, CD4 count  -continue with Bictarvy Reports history of HIV on Biktarvy (reports last viral load undetectable), Home meds (Per med rec): Biktarvy 50/200/25 1tab daily and Bactrim 800/160 1 tab BID. Unclear indication for Bactrim BID, unable to reach the prescribing provider    Plan:   -f/u viral load, CD4 count  -continue with Bictarvy 1 tab daily   -continue with Bactrim 1 tab daily   -obtain collateral from providing provider

## 2023-11-06 NOTE — ED PROVIDER NOTE - OBJECTIVE STATEMENT
33 year old male with history of HIV on Biktarvy (reports last viral load undetectable) presenting as transfer from [facility] ED for scrotal/intergluteal abscesses. States for past week he has had severe pain and purulent drainage from perianal region as well as R scrotum--denies fevers or chills. Sexually active with men and women, engages in receptive anal intercourse. Found @ OSH to have R scrotal abscess as well as intergluteal fold abscesses on CT pelvis, given IV ctx/flagyl, sent here for further eval/management.

## 2023-11-06 NOTE — ED ADULT NURSE NOTE - CHIEF COMPLAINT QUOTE
BIBEMS as a transfer from [facility] d/t R sided scrotal abscess. Pt denies complaints upon arrival to ED.

## 2023-11-06 NOTE — ED PROVIDER NOTE - CLINICAL SUMMARY MEDICAL DECISION MAKING FREE TEXT BOX
33-year-old male with a history of HIV presenting with scrotal swelling concerning for possible abscess as well as evidence of a herpes outbreak to the perianal region.  CT scan of the pelvis was obtained for further evaluation and to rule out foreign years.  This appears to show a focal abscess of the right scrotum.  Treated with a dose of valacyclovir for the lesions to the perianal area.  Will arrange for transfer to [facility] to be evaluated by urology for possible drainage of the scrotal abscess.

## 2023-11-06 NOTE — H&P ADULT - NSHPPHYSICALEXAM_GEN_ALL_CORE
.  VITAL SIGNS:  T(C): 36.7 (11-06-23 @ 10:06), Max: 37.2 (11-06-23 @ 09:05)  T(F): 98 (11-06-23 @ 10:06), Max: 98.9 (11-06-23 @ 09:05)  HR: 73 (11-06-23 @ 10:06) (70 - 89)  BP: 127/55 (11-06-23 @ 10:06) (107/59 - 130/100)  BP(mean): --  RR: 18 (11-06-23 @ 10:06) (18 - 18)  SpO2: 97% (11-06-23 @ 10:06) (97% - 99%)  Wt(kg): --    PHYSICAL EXAM:    Constitutional: Resting comfortably in bed; NAD  Head: NC/AT  Eyes: PERRL, EOMI, clear conjunctiva  ENT: no nasal discharge; uvula midline, no oropharyngeal erythema or exudates; MMM  Neck: supple; no JVD or thyromegaly  Respiratory: CTA B/L; no W/R/R, no retractions  Cardiac: +S1/S2; RRR; no M/R/G;  Gastrointestinal: soft, NT/ND; no rebound or guarding; +BSx4  Extremities: WWP, no clubbing or cyanosis; no peripheral edema  Musculoskeletal: NROM x4; no joint swelling, tenderness or erythema  Vascular: 2+ radial, femoral, DP/PT pulses B/L  Dermatologic: skin warm, dry and intact; no rashes, wounds, or scars  Neurologic: AAOx3; CNII-XII grossly intact; no focal deficits  Psychiatric: affect and characteristics of appearance, verbalizations, behaviors are appropriate .  VITAL SIGNS:  T(C): 36.7 (11-06-23 @ 10:06), Max: 37.2 (11-06-23 @ 09:05)  T(F): 98 (11-06-23 @ 10:06), Max: 98.9 (11-06-23 @ 09:05)  HR: 73 (11-06-23 @ 10:06) (70 - 89)  BP: 127/55 (11-06-23 @ 10:06) (107/59 - 130/100)  BP(mean): --  RR: 18 (11-06-23 @ 10:06) (18 - 18)  SpO2: 97% (11-06-23 @ 10:06) (97% - 99%)  Wt(kg): --    PHYSICAL EXAM:    Constitutional: Resting comfortably in bed; NAD  Head: NC/AT  Eyes: EOMI, clear conjunctiva  ENT: no nasal discharge MMM  Neck: supple;   Respiratory: Normal respiratory effort, CTA B/L; no W/R/R, no retractions  Cardiac: +S1/S2; RRR; no M/R/G;  Gastrointestinal: soft, NT/ND; no rebound or guarding; +BSx4  Extremities: WWP, no clubbing or cyanosis; no peripheral edema  Dermatologic: skin warm, dry and intact; no rashes, wounds, or scars on exposed skin. Scrotal and intergluteal lesions noted with packing and mild blood noted, contained within the packing, No purulence noted   Neurologic: AAOx3; moves all extremities freely   Psychiatric: affect and characteristics of appearance, verbalizations, behaviors are appropriate

## 2023-11-06 NOTE — CONSULT NOTE ADULT - ASSESSMENT
33 year old male with history of HIV on Biktarvy (reports last viral load undetectable) presenting as transfer from [facility] ED for scrotal/intergluteal abscesses. States for past week he has had severe pain and purulent drainage from perianal region as well as R scrotum--denies fevers or chills. Sexually active with men and women, engages in receptive anal intercourse. Per pt, he was seen at [facility] last week, found to have scrotal lesion and sent home with outpatient  follow up which he was unable to do.      resident performed bedside scrotal incision and drainage using sterile technique. Scrotal wound cx sent x 2. packed and left open  Pt is afebrile, hemodynamically stable. Labs show no leukocytosis, Cr 1.06, UA neg nit/neg leukocyte esterase.    Plan:  -gluteal abscess per general sx  -f/u Wound cx x 2  -dispo per ED provider  -antibx per ED provider  33 year old male with history of HIV on Biktarvy (reports last viral load undetectable) presenting as transfer from [facility] ED for scrotal/intergluteal abscesses. States for past week he has had severe pain and purulent drainage from perianal region as well as R scrotum--denies fevers or chills. Sexually active with men and women, engages in receptive anal intercourse. Per pt, he was seen at [facility] last week, found to have scrotal lesion and sent home with outpatient  follow up which he was unable to do.      resident performed bedside scrotal incision and drainage using sterile technique. Scrotal wound cx sent x 2. packed and left open  Pt is afebrile, hemodynamically stable. Labs show no leukocytosis, Cr 1.06, UA neg nit/neg leukocyte esterase.    Plan:  -gluteal abscess per general sx  -f/u Wound cx x 2  -IV antibx per primary team  -will follow

## 2023-11-06 NOTE — ED ADULT NURSE NOTE - NSFALLUNIVINTERV_ED_ALL_ED
Bed/Stretcher in lowest position, wheels locked, appropriate side rails in place/Call bell, personal items and telephone in reach/Instruct patient to call for assistance before getting out of bed/chair/stretcher/Non-slip footwear applied when patient is off stretcher/[place] to call system/Physically safe environment - no spills, clutter or unnecessary equipment/Purposeful proactive rounding/Room/bathroom lighting operational, light cord in reach

## 2023-11-06 NOTE — ED PROVIDER NOTE - OBJECTIVE STATEMENT
33-year-old male with a history of HIV presenting complaining of swelling, pain, and drainage from the right side of his scrotum.  He states this has been getting worse over the last few days.  He was seen at [facility] a few days ago where he states they did nothing except give him a referral to urology which she has not yet followed up with.  He also complains of pain and drainage from the perianal area.  He states he has a history of herpes and thinks he is having a bad outbreak.  He is not taking any medication to treat the herpes.  He endorses being compliant with his HIV medications.

## 2023-11-06 NOTE — CONSULT NOTE ADULT - ATTENDING COMMENTS
Patient seen and examined, imaging reviewed.  Likely MRSA abscesses, R>L gluteal.  Now s/p I&D  Would benefit from IV antibiotics directed to MRSA  f/u cultures  Sitz baths

## 2023-11-06 NOTE — ED ADULT NURSE NOTE - BEFAST SPEECH SLURRED
Nephrology progress note    Patient was seen and examined, events over the last 24 h noted .    Allergies:  No Known Allergies    Hospital Medications:   MEDICATIONS  (STANDING):  atorvastatin 20 milliGRAM(s) Oral at bedtime  dextrose 5%. 1000 milliLiter(s) (50 mL/Hr) IV Continuous <Continuous>  dextrose 5%. 1000 milliLiter(s) (100 mL/Hr) IV Continuous <Continuous>  dextrose 50% Injectable 25 Gram(s) IV Push once  dextrose 50% Injectable 12.5 Gram(s) IV Push once  dextrose 50% Injectable 25 Gram(s) IV Push once  ergocalciferol 57062 Unit(s) Oral every week  glucagon  Injectable 1 milliGRAM(s) IntraMuscular once  heparin   Injectable 5000 Unit(s) SubCutaneous every 8 hours  insulin lispro (ADMELOG) corrective regimen sliding scale   SubCutaneous three times a day before meals  NIFEdipine XL 30 milliGRAM(s) Oral daily  sodium bicarbonate 650 milliGRAM(s) Oral three times a day  sodium bicarbonate  Infusion 0.094 mEq/kG/Hr (100 mL/Hr) IV Continuous <Continuous>        VITALS:  T(F): 96.2 (09-08-23 @ 05:06), Max: 98.9 (09-07-23 @ 20:43)  HR: 82 (09-08-23 @ 05:06)  BP: 108/57 (09-08-23 @ 05:06)  RR: 19 (09-08-23 @ 05:06)  SpO2: --  Wt(kg): --    09-07 @ 07:01  -  09-08 @ 07:00  --------------------------------------------------------  IN: 0 mL / OUT: 200 mL / NET: -200 mL    09-08 @ 07:01  -  09-08 @ 13:54  --------------------------------------------------------  IN: 0 mL / OUT: 600 mL / NET: -600 mL      Height (cm): 185.4 (09-08 @ 13:18)  Weight (kg): 79.4 (09-08 @ 13:18)  BMI (kg/m2): 23.1 (09-08 @ 13:18)  BSA (m2): 2.03 (09-08 @ 13:18)    PHYSICAL EXAM:  Constitutional: NAD  HEENT: anicteric sclera, oropharynx clear, MMM  Neck: No JVD  Respiratory: CTAB, no wheezes, rales or rhonchi  Cardiovascular: S1, S2, RRR  Gastrointestinal: BS+, soft, NT/ND  Extremities: No cyanosis or clubbing. No peripheral edema  :  No angelo.   Skin: No rashes    LABS:  09-08    137  |  109  |  70<HH>  ----------------------------<  144<H>  4.9   |  17  |  5.3<HH>    Ca    8.2<L>      08 Sep 2023 05:41  Phos  4.7     09-07  Mg     1.9     09-08    TPro  8.4<H>  /  Alb  2.7<L>  /  TBili  0.6  /  DBili      /  AST  14  /  ALT  10  /  AlkPhos  102  09-07                          9.9    8.05  )-----------( 243      ( 08 Sep 2023 05:41 )             31.3       Urine Studies:  Urinalysis Basic - ( 08 Sep 2023 05:41 )    Color:  / Appearance:  / SG:  / pH:   Gluc: 144 mg/dL / Ketone:   / Bili:  / Urobili:    Blood:  / Protein:  / Nitrite:    Leuk Esterase:  / RBC:  / WBC    Sq Epi:  / Non Sq Epi:  / Bacteria:       Sodium, Random Urine: 45.0 mmoL/L (09-07 @ 12:20)  Creatinine, Random Urine: 98 mg/dL (09-07 @ 12:20)  Protein/Creatinine Ratio Calculation: 0.9 Ratio (09-07 @ 12:20)  Osmolality, Random Urine: 361 mos/kg (09-07 @ 12:20)  Potassium, Random Urine: 14 mmol/L (09-07 @ 12:20)    RADIOLOGY & ADDITIONAL STUDIES:   Nephrology progress note    Patient was seen and examined, events over the last 24 h noted .  Cr unchnaged  feels well  Non-oliguric    Allergies:  No Known Allergies    Hospital Medications:   MEDICATIONS  (STANDING):  atorvastatin 20 milliGRAM(s) Oral at bedtime  dextrose 5%. 1000 milliLiter(s) (50 mL/Hr) IV Continuous <Continuous>  dextrose 5%. 1000 milliLiter(s) (100 mL/Hr) IV Continuous <Continuous>  dextrose 50% Injectable 25 Gram(s) IV Push once  dextrose 50% Injectable 12.5 Gram(s) IV Push once  dextrose 50% Injectable 25 Gram(s) IV Push once  ergocalciferol 88052 Unit(s) Oral every week  glucagon  Injectable 1 milliGRAM(s) IntraMuscular once  heparin   Injectable 5000 Unit(s) SubCutaneous every 8 hours  insulin lispro (ADMELOG) corrective regimen sliding scale   SubCutaneous three times a day before meals  NIFEdipine XL 30 milliGRAM(s) Oral daily  sodium bicarbonate 650 milliGRAM(s) Oral three times a day  sodium bicarbonate  Infusion 0.094 mEq/kG/Hr (100 mL/Hr) IV Continuous <Continuous>        VITALS:  T(F): 96.2 (09-08-23 @ 05:06), Max: 98.9 (09-07-23 @ 20:43)  HR: 82 (09-08-23 @ 05:06)  BP: 108/57 (09-08-23 @ 05:06)  RR: 19 (09-08-23 @ 05:06)  SpO2: --  Wt(kg): --    09-07 @ 07:01  -  09-08 @ 07:00  --------------------------------------------------------  IN: 0 mL / OUT: 200 mL / NET: -200 mL    09-08 @ 07:01  -  09-08 @ 13:54  --------------------------------------------------------  IN: 0 mL / OUT: 600 mL / NET: -600 mL      Height (cm): 185.4 (09-08 @ 13:18)  Weight (kg): 79.4 (09-08 @ 13:18)  BMI (kg/m2): 23.1 (09-08 @ 13:18)  BSA (m2): 2.03 (09-08 @ 13:18)    PHYSICAL EXAM:  Constitutional: NAD  HEENT: anicteric sclera, oropharynx clear, MMM  Neck: No JVD  Respiratory: CTAB, no wheezes, rales or rhonchi  Cardiovascular: S1, S2, RRR  Gastrointestinal: BS+, soft, NT/ND  Extremities: bilat LE rash with erythema, scaling, chronic  :  +angelo.     LABS:  09-08    137  |  109  |  70<HH>  ----------------------------<  144<H>  4.9   |  17  |  5.3<HH>    Ca    8.2<L>      08 Sep 2023 05:41  Phos  4.7     09-07  Mg     1.9     09-08    TPro  8.4<H>  /  Alb  2.7<L>  /  TBili  0.6  /  DBili      /  AST  14  /  ALT  10  /  AlkPhos  102  09-07                          9.9    8.05  )-----------( 243      ( 08 Sep 2023 05:41 )             31.3       Urine Studies:  Urinalysis Basic - ( 08 Sep 2023 05:41 )    Color:  / Appearance:  / SG:  / pH:   Gluc: 144 mg/dL / Ketone:   / Bili:  / Urobili:    Blood:  / Protein:  / Nitrite:    Leuk Esterase:  / RBC:  / WBC    Sq Epi:  / Non Sq Epi:  / Bacteria:       Sodium, Random Urine: 45.0 mmoL/L (09-07 @ 12:20)  Creatinine, Random Urine: 98 mg/dL (09-07 @ 12:20)  Protein/Creatinine Ratio Calculation: 0.9 Ratio (09-07 @ 12:20)  Osmolality, Random Urine: 361 mos/kg (09-07 @ 12:20)  Potassium, Random Urine: 14 mmol/L (09-07 @ 12:20)    RADIOLOGY & ADDITIONAL STUDIES:   No

## 2023-11-06 NOTE — H&P ADULT - ATTENDING COMMENTS
See and examined at bedside w housestaff  PCP: Fabiola Bullock - at [facility]   33M w HIV on biktarvy (unknown CD4 but reports VL is undectable) p/w scrotal and R gluteal pain found to have scrotal and gluteal abscess on CT, s/p I&D w packing    Pt reports sore spot w white tip appearing on scrotum initially. States he shaves his pubic hairs in the groin but not his scrotum. Reports adherence to Biktarvy but states he misplaced his Bactrim -- does not remember indication. Reports being in ED at [facility] and not receiving Abx and not being able to follow-up afterwards. Today after I&D and packing pt reports pain improved. Denies fever, chills, sweats, dysuria, frequency, diarrhea  SH: Marijuana, tobacco use. Denies other substances or IVDU.   Exam: male in NAD on RA, MMM, RRR, nml resp effort, CTAB, Abd soft, NABS, non-tender  : Scrotum w R sided swelling, hyperpigmentation, packing in place. R gluteal region w packing in place - area of exquisite tenderness remaining.  Imaging: intergluteal fold abscesses - RIGHT 3x1.5x4.0cm; LEFT 2.2x1.5x2cm  - No gas  RIGHT Scrotal wall abscess 2x1.6x2cm    #R scrotal abscess  #R Gluteal abscess  #HIV - c/w Biktarvy  #Tobacco use d/o - 6-7cig/day. Counseled on cessation    Plan  Overall, pt w abscesses in R scrotum and R gluteal reagion w drainage s/p I&D by urology and general surgery, respectively w packing.  -Cipro, Flagyl. s/p Vancomycin for MRSA coverage  -f/u abscess culture, sensitivities  -PRN tylenol, NSIADs for mild/moderate pain  -f/u CD4, Viral load

## 2023-11-06 NOTE — ED ADULT TRIAGE NOTE - CHIEF COMPLAINT QUOTE
BIBEMS as a transfer from [facility] d/t R sided scrotal abscess. Pt denies complaints upon arrival to ED.

## 2023-11-07 ENCOUNTER — TRANSCRIPTION ENCOUNTER (OUTPATIENT)
Age: 33
End: 2023-11-07

## 2023-11-07 DIAGNOSIS — A49.02 METHICILLIN RESISTANT STAPHYLOCOCCUS AUREUS INFECTION, UNSPECIFIED SITE: ICD-10-CM

## 2023-11-07 PROBLEM — B20 HUMAN IMMUNODEFICIENCY VIRUS [HIV] DISEASE: Chronic | Status: ACTIVE | Noted: 2023-11-06

## 2023-11-07 PROBLEM — B00.9 HERPESVIRAL INFECTION, UNSPECIFIED: Chronic | Status: ACTIVE | Noted: 2023-11-06

## 2023-11-07 PROBLEM — Z00.00 ENCOUNTER FOR PREVENTIVE HEALTH EXAMINATION: Status: ACTIVE | Noted: 2023-11-07

## 2023-11-07 LAB
4/8 RATIO: 0.28 RATIO — LOW (ref 0.9–3.6)
4/8 RATIO: 0.28 RATIO — LOW (ref 0.9–3.6)
ABS CD8: 1074 CELLS/UL — HIGH (ref 142–740)
ABS CD8: 1074 CELLS/UL — HIGH (ref 142–740)
ANION GAP SERPL CALC-SCNC: 12 MMOL/L — SIGNIFICANT CHANGE UP (ref 5–17)
ANION GAP SERPL CALC-SCNC: 12 MMOL/L — SIGNIFICANT CHANGE UP (ref 5–17)
BASOPHILS # BLD AUTO: 0.05 K/UL — SIGNIFICANT CHANGE UP (ref 0–0.2)
BASOPHILS # BLD AUTO: 0.05 K/UL — SIGNIFICANT CHANGE UP (ref 0–0.2)
BASOPHILS NFR BLD AUTO: 0.7 % — SIGNIFICANT CHANGE UP (ref 0–2)
BASOPHILS NFR BLD AUTO: 0.7 % — SIGNIFICANT CHANGE UP (ref 0–2)
BUN SERPL-MCNC: 11 MG/DL — SIGNIFICANT CHANGE UP (ref 7–23)
BUN SERPL-MCNC: 11 MG/DL — SIGNIFICANT CHANGE UP (ref 7–23)
CALCIUM SERPL-MCNC: 8.6 MG/DL — SIGNIFICANT CHANGE UP (ref 8.4–10.5)
CALCIUM SERPL-MCNC: 8.6 MG/DL — SIGNIFICANT CHANGE UP (ref 8.4–10.5)
CD16+CD56+ CELLS NFR BLD: 11 % — SIGNIFICANT CHANGE UP (ref 5–23)
CD16+CD56+ CELLS NFR BLD: 11 % — SIGNIFICANT CHANGE UP (ref 5–23)
CD16+CD56+ CELLS NFR SPEC: 203 CELLS/UL — SIGNIFICANT CHANGE UP (ref 71–410)
CD16+CD56+ CELLS NFR SPEC: 203 CELLS/UL — SIGNIFICANT CHANGE UP (ref 71–410)
CD19 BLASTS SPEC-ACNC: 172 CELLS/UL — SIGNIFICANT CHANGE UP (ref 84–469)
CD19 BLASTS SPEC-ACNC: 172 CELLS/UL — SIGNIFICANT CHANGE UP (ref 84–469)
CD19 BLASTS SPEC-ACNC: 9 % — SIGNIFICANT CHANGE UP (ref 6–24)
CD19 BLASTS SPEC-ACNC: 9 % — SIGNIFICANT CHANGE UP (ref 6–24)
CD3 BLASTS SPEC-ACNC: 1461 CELLS/UL — SIGNIFICANT CHANGE UP (ref 672–1870)
CD3 BLASTS SPEC-ACNC: 1461 CELLS/UL — SIGNIFICANT CHANGE UP (ref 672–1870)
CD3 BLASTS SPEC-ACNC: 78 % — SIGNIFICANT CHANGE UP (ref 59–83)
CD3 BLASTS SPEC-ACNC: 78 % — SIGNIFICANT CHANGE UP (ref 59–83)
CD4 %: 16 % — LOW (ref 30–62)
CD4 %: 16 % — LOW (ref 30–62)
CD8 %: 57 % — HIGH (ref 12–36)
CD8 %: 57 % — HIGH (ref 12–36)
CHLORIDE SERPL-SCNC: 108 MMOL/L — SIGNIFICANT CHANGE UP (ref 96–108)
CHLORIDE SERPL-SCNC: 108 MMOL/L — SIGNIFICANT CHANGE UP (ref 96–108)
CO2 SERPL-SCNC: 20 MMOL/L — LOW (ref 22–31)
CO2 SERPL-SCNC: 20 MMOL/L — LOW (ref 22–31)
CREAT SERPL-MCNC: 1.02 MG/DL — SIGNIFICANT CHANGE UP (ref 0.5–1.3)
CREAT SERPL-MCNC: 1.02 MG/DL — SIGNIFICANT CHANGE UP (ref 0.5–1.3)
EGFR: 101 ML/MIN/1.73M2 — SIGNIFICANT CHANGE UP
EGFR: 101 ML/MIN/1.73M2 — SIGNIFICANT CHANGE UP
EOSINOPHIL # BLD AUTO: 0.21 K/UL — SIGNIFICANT CHANGE UP (ref 0–0.5)
EOSINOPHIL # BLD AUTO: 0.21 K/UL — SIGNIFICANT CHANGE UP (ref 0–0.5)
EOSINOPHIL NFR BLD AUTO: 3 % — SIGNIFICANT CHANGE UP (ref 0–6)
EOSINOPHIL NFR BLD AUTO: 3 % — SIGNIFICANT CHANGE UP (ref 0–6)
GLUCOSE SERPL-MCNC: 101 MG/DL — HIGH (ref 70–99)
GLUCOSE SERPL-MCNC: 101 MG/DL — HIGH (ref 70–99)
HCT VFR BLD CALC: 36.6 % — LOW (ref 39–50)
HCT VFR BLD CALC: 36.6 % — LOW (ref 39–50)
HGB BLD-MCNC: 12 G/DL — LOW (ref 13–17)
HGB BLD-MCNC: 12 G/DL — LOW (ref 13–17)
HIV-1 VIRAL LOAD RESULT: SIGNIFICANT CHANGE UP
HIV-1 VIRAL LOAD RESULT: SIGNIFICANT CHANGE UP
HIV1 RNA # SERPL NAA+PROBE: SIGNIFICANT CHANGE UP COPIES/ML
HIV1 RNA # SERPL NAA+PROBE: SIGNIFICANT CHANGE UP COPIES/ML
HIV1 RNA SER-IMP: SIGNIFICANT CHANGE UP
HIV1 RNA SER-IMP: SIGNIFICANT CHANGE UP
HIV1 RNA SERPL NAA+PROBE-ACNC: SIGNIFICANT CHANGE UP
HIV1 RNA SERPL NAA+PROBE-ACNC: SIGNIFICANT CHANGE UP
HIV1 RNA SERPL NAA+PROBE-LOG#: SIGNIFICANT CHANGE UP LG COP/ML
HIV1 RNA SERPL NAA+PROBE-LOG#: SIGNIFICANT CHANGE UP LG COP/ML
IMM GRANULOCYTES NFR BLD AUTO: 0.7 % — SIGNIFICANT CHANGE UP (ref 0–0.9)
IMM GRANULOCYTES NFR BLD AUTO: 0.7 % — SIGNIFICANT CHANGE UP (ref 0–0.9)
LYMPHOCYTES # BLD AUTO: 1.86 K/UL — SIGNIFICANT CHANGE UP (ref 1–3.3)
LYMPHOCYTES # BLD AUTO: 1.86 K/UL — SIGNIFICANT CHANGE UP (ref 1–3.3)
LYMPHOCYTES # BLD AUTO: 26.6 % — SIGNIFICANT CHANGE UP (ref 13–44)
LYMPHOCYTES # BLD AUTO: 26.6 % — SIGNIFICANT CHANGE UP (ref 13–44)
MAGNESIUM SERPL-MCNC: 1.8 MG/DL — SIGNIFICANT CHANGE UP (ref 1.6–2.6)
MAGNESIUM SERPL-MCNC: 1.8 MG/DL — SIGNIFICANT CHANGE UP (ref 1.6–2.6)
MCHC RBC-ENTMCNC: 31.3 PG — SIGNIFICANT CHANGE UP (ref 27–34)
MCHC RBC-ENTMCNC: 31.3 PG — SIGNIFICANT CHANGE UP (ref 27–34)
MCHC RBC-ENTMCNC: 32.8 GM/DL — SIGNIFICANT CHANGE UP (ref 32–36)
MCHC RBC-ENTMCNC: 32.8 GM/DL — SIGNIFICANT CHANGE UP (ref 32–36)
MCV RBC AUTO: 95.3 FL — SIGNIFICANT CHANGE UP (ref 80–100)
MCV RBC AUTO: 95.3 FL — SIGNIFICANT CHANGE UP (ref 80–100)
MONOCYTES # BLD AUTO: 0.55 K/UL — SIGNIFICANT CHANGE UP (ref 0–0.9)
MONOCYTES # BLD AUTO: 0.55 K/UL — SIGNIFICANT CHANGE UP (ref 0–0.9)
MONOCYTES NFR BLD AUTO: 7.9 % — SIGNIFICANT CHANGE UP (ref 2–14)
MONOCYTES NFR BLD AUTO: 7.9 % — SIGNIFICANT CHANGE UP (ref 2–14)
NEUTROPHILS # BLD AUTO: 4.27 K/UL — SIGNIFICANT CHANGE UP (ref 1.8–7.4)
NEUTROPHILS # BLD AUTO: 4.27 K/UL — SIGNIFICANT CHANGE UP (ref 1.8–7.4)
NEUTROPHILS NFR BLD AUTO: 61.1 % — SIGNIFICANT CHANGE UP (ref 43–77)
NEUTROPHILS NFR BLD AUTO: 61.1 % — SIGNIFICANT CHANGE UP (ref 43–77)
NRBC # BLD: 0 /100 WBCS — SIGNIFICANT CHANGE UP (ref 0–0)
NRBC # BLD: 0 /100 WBCS — SIGNIFICANT CHANGE UP (ref 0–0)
PLATELET # BLD AUTO: 260 K/UL — SIGNIFICANT CHANGE UP (ref 150–400)
PLATELET # BLD AUTO: 260 K/UL — SIGNIFICANT CHANGE UP (ref 150–400)
POTASSIUM SERPL-MCNC: 3.8 MMOL/L — SIGNIFICANT CHANGE UP (ref 3.5–5.3)
POTASSIUM SERPL-MCNC: 3.8 MMOL/L — SIGNIFICANT CHANGE UP (ref 3.5–5.3)
POTASSIUM SERPL-SCNC: 3.8 MMOL/L — SIGNIFICANT CHANGE UP (ref 3.5–5.3)
POTASSIUM SERPL-SCNC: 3.8 MMOL/L — SIGNIFICANT CHANGE UP (ref 3.5–5.3)
RBC # BLD: 3.84 M/UL — LOW (ref 4.2–5.8)
RBC # BLD: 3.84 M/UL — LOW (ref 4.2–5.8)
RBC # FLD: 13.2 % — SIGNIFICANT CHANGE UP (ref 10.3–14.5)
RBC # FLD: 13.2 % — SIGNIFICANT CHANGE UP (ref 10.3–14.5)
SODIUM SERPL-SCNC: 140 MMOL/L — SIGNIFICANT CHANGE UP (ref 135–145)
SODIUM SERPL-SCNC: 140 MMOL/L — SIGNIFICANT CHANGE UP (ref 135–145)
T PALLIDUM AB TITR SER: NEGATIVE — SIGNIFICANT CHANGE UP
T PALLIDUM AB TITR SER: NEGATIVE — SIGNIFICANT CHANGE UP
T-CELL CD4 SUBSET PNL BLD: 296 CELLS/UL — LOW (ref 489–1457)
T-CELL CD4 SUBSET PNL BLD: 296 CELLS/UL — LOW (ref 489–1457)
WBC # BLD: 6.99 K/UL — SIGNIFICANT CHANGE UP (ref 3.8–10.5)
WBC # BLD: 6.99 K/UL — SIGNIFICANT CHANGE UP (ref 3.8–10.5)
WBC # FLD AUTO: 6.99 K/UL — SIGNIFICANT CHANGE UP (ref 3.8–10.5)
WBC # FLD AUTO: 6.99 K/UL — SIGNIFICANT CHANGE UP (ref 3.8–10.5)

## 2023-11-07 PROCEDURE — 99233 SBSQ HOSP IP/OBS HIGH 50: CPT | Mod: GC

## 2023-11-07 RX ORDER — ONDANSETRON 8 MG/1
4 TABLET, FILM COATED ORAL ONCE
Refills: 0 | Status: COMPLETED | OUTPATIENT
Start: 2023-11-07 | End: 2023-11-07

## 2023-11-07 RX ORDER — VANCOMYCIN HCL 1 G
1000 VIAL (EA) INTRAVENOUS ONCE
Refills: 0 | Status: COMPLETED | OUTPATIENT
Start: 2023-11-07 | End: 2023-11-07

## 2023-11-07 RX ORDER — LINEZOLID 600 MG/300ML
1 INJECTION, SOLUTION INTRAVENOUS
Qty: 14 | Refills: 0
Start: 2023-11-07 | End: 2023-11-13

## 2023-11-07 RX ORDER — CEPHALEXIN 500 MG
1 CAPSULE ORAL
Qty: 28 | Refills: 0
Start: 2023-11-07 | End: 2023-11-13

## 2023-11-07 RX ORDER — CEPHALEXIN 500 MG
1 CAPSULE ORAL
Qty: 40 | Refills: 0
Start: 2023-11-07 | End: 2023-11-16

## 2023-11-07 RX ADMIN — Medication 250 MILLIGRAM(S): at 17:58

## 2023-11-07 RX ADMIN — Medication 250 MILLIGRAM(S): at 05:55

## 2023-11-07 RX ADMIN — Medication 400 MILLIGRAM(S): at 19:17

## 2023-11-07 RX ADMIN — Medication 650 MILLIGRAM(S): at 01:20

## 2023-11-07 RX ADMIN — Medication 200 MILLIGRAM(S): at 03:11

## 2023-11-07 RX ADMIN — Medication 200 MILLIGRAM(S): at 14:49

## 2023-11-07 RX ADMIN — Medication 400 MILLIGRAM(S): at 20:17

## 2023-11-07 RX ADMIN — ONDANSETRON 4 MILLIGRAM(S): 8 TABLET, FILM COATED ORAL at 15:11

## 2023-11-07 RX ADMIN — Medication 1 TABLET(S): at 21:37

## 2023-11-07 RX ADMIN — BICTEGRAVIR SODIUM, EMTRICITABINE, AND TENOFOVIR ALAFENAMIDE FUMARATE 1 TABLET(S): 30; 120; 15 TABLET ORAL at 21:38

## 2023-11-07 RX ADMIN — Medication 650 MILLIGRAM(S): at 00:20

## 2023-11-07 RX ADMIN — Medication 100 MILLIGRAM(S): at 05:55

## 2023-11-07 NOTE — PROGRESS NOTE ADULT - ASSESSMENT
33 year old male with history of HIV on Biktarvy (reports last viral load undetectable) presenting as transfer from [facility] ED with 1 week hx of pain and drainage in the groin area found to have scrotal/intergluteal abscesses admitted for I&D and treatment with IV antibiotics  33 year old male with history of HIV on Biktarvy (reports last viral load undetectable) presenting as transfer from [facility] ED with 1 week hx of pain and drainage in the groin area found to have scrotal/intergluteal abscesses admitted for I&D and treatment with IV antibiotics

## 2023-11-07 NOTE — DISCHARGE NOTE PROVIDER - NSDCCPCAREPLAN_GEN_ALL_CORE_FT
PRINCIPAL DISCHARGE DIAGNOSIS  Diagnosis: Gluteal abscess  Assessment and Plan of Treatment:      PRINCIPAL DISCHARGE DIAGNOSIS  Diagnosis: Gluteal abscess  Assessment and Plan of Treatment: Gluteal/Scrotal Abscess  Treatment:   1. Keflex 500mg every 6 hours for 7 days  2. Linezolid 600mg ever 12 hours for 7 days  3. Please remove 1cm of packing of the wound every day and cover with 4x4 gauze   4. Please follow up with Urology. You have an appointment scheduled.  Abscess  CT of pelvis shows:  Bilateral intergluteal fold abscesses. Additional finding of Right scrotal wall abscess, No soft tissue gas.   s/p IV ctx/flagyl (at [facility]), bedside I&D performed for scrotal and gluteal abscesses by urology/surgery. As per surgery: No further general surgery procedure planned. Continue with antibiotics  An abscess is an infected area that contains a collection of pus and debris. It can occur in almost any part of the body and occurs when the tissue gets infection. Symptoms include a painful mass that is red, warm, tender that might break open and have drainage. You received an incision and drainage procedure, and the fluid grew a bacteria called pseudomonas. You were given antibiotics to take for 10 days after leaving the hospital called Ciprofloxacin 500mg BID. Continue taking this medication twice a day until it is complete. You were sent home with wound packing instructions and a suture removal kit, so you can use the forceps to pack gauze into the wound and cover it. This needs to be done everyday. Follow up with your pcp for further management.   SEEK MEDICAL CARE IF YOU HAVE THE FOLLOWING SYMPTOMS: chills, fever, muscle aches, or red streaking from the area.       PRINCIPAL DISCHARGE DIAGNOSIS  Diagnosis: Gluteal abscess  Assessment and Plan of Treatment: Gluteal/Scrotal Abscess  Treatment:   1. Keflex 500mg every 6 hours for 7 days  2. Doxy 100mg 2x per day for 7 days   3. Please remove 1cm of packing of the wound every day and cover with 4x4 gauze   4. Please follow up with Urology. You have an appointment scheduled.  Abscess  CT of pelvis shows:  Bilateral intergluteal fold abscesses. Additional finding of Right scrotal wall abscess, No soft tissue gas.   s/p IV ctx/flagyl (at [facility]), bedside I&D performed for scrotal and gluteal abscesses by urology/surgery. As per surgery: No further general surgery procedure planned. Continue with antibiotics  An abscess is an infected area that contains a collection of pus and debris. It can occur in almost any part of the body and occurs when the tissue gets infection. Symptoms include a painful mass that is red, warm, tender that might break open and have drainage. You received an incision and drainage procedure, and the fluid grew a bacteria called pseudomonas. You were given antibiotics to take for 10 days after leaving the hospital called Ciprofloxacin 500mg BID. Continue taking this medication twice a day until it is complete. You were sent home with wound packing instructions and a suture removal kit, so you can use the forceps to pack gauze into the wound and cover it. This needs to be done everyday. Follow up with your pcp for further management.   SEEK MEDICAL CARE IF YOU HAVE THE FOLLOWING SYMPTOMS: chills, fever, muscle aches, or red streaking from the area.

## 2023-11-07 NOTE — DISCHARGE NOTE PROVIDER - HOSPITAL COURSE
#Discharge: do not delete    33 year old male with history of HIV on Biktarvy (reports last viral load undetectable) presenting as transfer from [facility] ED with 1 week hx of pain and drainage in the groin area found to have scrotal/intergluteal abscesses admitted for I&D and treatment with IV antibiotics       1. Abscess: #Scrotal/intergluteal abscess Transferred from [facility] ED for scrotal/intergluteal abscesses.Reports severe pain, purulent drainage from perianal region as well as R scrotum for the past week.  Recently seen at [facility] hx last week, found to have scrotal lesion and sent home with outpatient  follow up which he was unable to do. Sexually active with men and women, engages in receptive anal intercourse. Denies recent sexual activity, trauma to the groin In the ED: Aferbile, no leukocytosis, UA negative for infection, non-toxic appearing. CT of pelvis shows:  Bilateral intergluteal fold abscesses. Additional finding of Right scrotal wall abscess, No soft tissue gas.   s/p IV ctx/flagyl (at [facility]), bedside I&D performed for scrotal and gluteal abscesses by urology/surgery. As per surgery: No further general surgery procedure planned     -Ciprofloxacin 400mg IV BID (to cover gram-negative)  Metronidazole 500mg IV q8hr (to cover anaerobes)  -Vancomycin 15mg/kg 1000mg IV BID (to cover for MRSA given purulence)   -Pain control with Tylenol 650mg P.O PRN for mild and moderate pain, Ibuprofen 600mg P.O q6 for severe pain  -f/u blood cultures  -f/u wound cultures  -Sitz baths  -f/u surgery  and urology recs   -f/u ESR/CRP.    2. HIV diseaseReports history of HIV on Biktarvy (reports last viral load undetectable), Home meds (Per med rec): Biktarvy 50/200/25 1tab daily and Bactrim 800/160 1 tab BID. Unclear indication for Bactrim BID, unable to reach the prescribing provider  Plan:   -f/u viral load, CD4 count  -continue with Bictarvy 1 tab daily   -continue with Bactrim 1 tab daily   -obtain collateral from providing provider.        Inpatient treatment course:     Patient was discharged to: (home/[facility]/acute rehab/hospice, etc. and w/ home health/home PT/RN/home O2)    New medications:   Changes to old medications:  Medications that were stopped:    Items to follow up as outpatient:    Physical exam at the time of discharge:   Constitutional: Resting comfortably in bed; NAD  Head: NC/AT  Eyes: EOMI, clear conjunctiva  ENT: no nasal discharge MMM  Neck: supple;   Respiratory: Normal respiratory effort, CTA B/L; no W/R/R, no retractions  Cardiac: +S1/S2; RRR; no M/R/G;  Gastrointestinal: soft, NT/ND; no rebound or guarding; +BSx4  Extremities: WWP, no clubbing or cyanosis; no peripheral edema  Dermatologic: skin warm, dry and intact; no rashes, wounds, or scars on exposed skin. Scrotal and intergluteal lesions noted with packing and mild blood noted, contained within the packing, No purulence noted   Neurologic: AAOx3; moves all extremities freely   Psychiatric: affect and characteristics of appearance, verbalizations, behaviors are appropriate      LABS & STUDIES:   #Discharge: do not delete    33 year old male with history of HIV on Biktarvy (reports last viral load undetectable) presenting as transfer from [facility] ED with 1 week hx of pain and drainage in the groin area found to have scrotal/intergluteal abscesses admitted for I&D and treatment with IV antibiotics       1. Abscess: #Scrotal/intergluteal abscess Transferred from [facility] ED for scrotal/intergluteal abscesses.Reports severe pain, purulent drainage from perianal region as well as R scrotum for the past week.  Recently seen at [facility] last week, found to have scrotal lesion and sent home with outpatient  follow up which he was unable to do. Sexually active with men and women, engages in receptive anal intercourse. Denies recent sexual activity, trauma to the groin In the ED: Aferbile, no leukocytosis, UA negative for infection, non-toxic appearing. CT of pelvis shows:  Bilateral intergluteal fold abscesses. Additional finding of Right scrotal wall abscess, No soft tissue gas.   s/p IV ctx/flagyl (at [facility]), bedside I&D performed for scrotal and gluteal abscesses by urology/surgery. As per surgery: No further general surgery procedure planned     - start Keflex and augmentin for 7 days   -Pain control with Tylenol 650mg P.O PRN for mild and moderate pain, Ibuprofen 600mg P.O q6 for severe pain  -Sitz baths  - each day remove 1cm of packing and cover with 4x4 gauze  - f/u with Urology outpatient     2. HIV diseaseReports history of HIV on Biktarvy (reports last viral load undetectable), Home meds (Per med rec): Biktarvy 50/200/25 1tab daily and Bactrim 800/160 1 tab BID. Unclear indication for Bactrim BID, unable to reach the prescribing provider  Plan:   -f/u viral load, CD4 count  -continue with Bictarvy 1 tab daily   -continue with Bactrim 1 tab daily   -obtain collateral from providing provider.        Inpatient treatment course:     Patient was discharged to: (home/[facility]/acute rehab/hospice, etc. and w/ home health/home PT/RN/home O2)    New medications:   Changes to old medications:  Medications that were stopped:    Items to follow up as outpatient:    Physical exam at the time of discharge:   Constitutional: Resting comfortably in bed; NAD  Head: NC/AT  Eyes: EOMI, clear conjunctiva  ENT: no nasal discharge MMM  Neck: supple;   Respiratory: Normal respiratory effort, CTA B/L; no W/R/R, no retractions  Cardiac: +S1/S2; RRR; no M/R/G;  Gastrointestinal: soft, NT/ND; no rebound or guarding; +BSx4  Extremities: WWP, no clubbing or cyanosis; no peripheral edema  Dermatologic: skin warm, dry and intact; no rashes, wounds, or scars on exposed skin. Scrotal and intergluteal lesions noted with packing and mild blood noted, contained within the packing, No purulence noted   Neurologic: AAOx3; moves all extremities freely   Psychiatric: affect and characteristics of appearance, verbalizations, behaviors are appropriate      LABS & STUDIES:   #Discharge: do not delete    33 year old male with history of HIV on Biktarvy (reports last viral load undetectable) presenting as transfer from [facility] ED with 1 week hx of pain and drainage in the groin area found to have scrotal/intergluteal abscesses admitted for I&D and treatment with IV antibiotics       1. Abscess: #Scrotal/intergluteal abscess Transferred from [facility] ED for scrotal/intergluteal abscesses.Reports severe pain, purulent drainage from perianal region as well as R scrotum for the past week.  Recently seen at [facility] hx last week, found to have scrotal lesion and sent home with outpatient  follow up which he was unable to do. Sexually active with men and women, engages in receptive anal intercourse. Denies recent sexual activity, trauma to the groin In the ED: Aferbile, no leukocytosis, UA negative for infection, non-toxic appearing. CT of pelvis shows:  Bilateral intergluteal fold abscesses. Additional finding of Right scrotal wall abscess, No soft tissue gas.   s/p IV ctx/flagyl (at [facility]), bedside I&D performed for scrotal and gluteal abscesses by urology/surgery. As per surgery: No further general surgery procedure planned     - start Keflex 500mg q6h for 7 days and Linezolid 600mg q12h for 7 days   - Please follow up with urology   -Pain control with Tylenol 650mg P.O PRN for mild and moderate pain, Ibuprofen 600mg P.O q6 for severe pain  -Sitz baths  - each day remove 1cm of packing and cover with 4x4 gauze  - f/u with Urology outpatient     2. HIV disease Reports history of HIV on Biktarvy (reports last viral load undetectable), Home meds (Per med rec): Biktarvy 50/200/25 1tab daily and Bactrim 800/160 1 tab BID. Unclear indication for Bactrim BID, unable to reach the prescribing provider   - c/w home meds         Inpatient treatment course: I&D and IVabx     Patient was discharged to: home    New medications: Keflex, Linezolid       Items to follow up as outpatient: Please follow up with urology     Physical exam at the time of discharge:   Constitutional: Resting comfortably in bed; NAD  Head: NC/AT  Eyes: EOMI, clear conjunctiva  ENT: no nasal discharge MMM  Neck: supple;   Respiratory: Normal respiratory effort, CTA B/L; no W/R/R, no retractions  Cardiac: +S1/S2; RRR; no M/R/G;  Gastrointestinal: soft, NT/ND; no rebound or guarding; +BSx4  Extremities: WWP, no clubbing or cyanosis; no peripheral edema  Dermatologic: skin warm, dry and intact; no rashes, wounds, or scars on exposed skin. Scrotal and intergluteal lesions noted with packing and mild blood noted, contained within the packing, No purulence noted   Neurologic: AAOx3; moves all extremities freely   Psychiatric: affect and characteristics of appearance, verbalizations, behaviors are appropriate      LABS & STUDIES:   #Discharge: do not delete    33 year old male with history of HIV on Biktarvy (reports last viral load undetectable) presenting as transfer from [facility] ED with 1 week hx of pain and drainage in the groin area found to have scrotal/intergluteal abscesses admitted for I&D and treatment with IV antibiotics       1. Abscess: #Scrotal/intergluteal abscess Transferred from [facility] ED for scrotal/intergluteal abscesses.Reports severe pain, purulent drainage from perianal region as well as R scrotum for the past week.  Recently seen at [facility] hx last week, found to have scrotal lesion and sent home with outpatient  follow up which he was unable to do. Sexually active with men and women, engages in receptive anal intercourse. Denies recent sexual activity, trauma to the groin In the ED: Aferbile, no leukocytosis, UA negative for infection, non-toxic appearing. CT of pelvis shows:  Bilateral intergluteal fold abscesses. Additional finding of Right scrotal wall abscess, No soft tissue gas.   s/p IV ctx/flagyl (at [facility]), bedside I&D performed for scrotal and gluteal abscesses by urology/surgery. As per surgery: No further general surgery procedure planned     - start Keflex 500mg q6h for 7 days and Doxy 100mg q12h for 7 days   - Please follow up with urology   -Pain control with Tylenol 650mg P.O PRN for mild and moderate pain, Ibuprofen 600mg P.O q6 for severe pain  -Sitz baths  - each day remove 1cm of packing and cover with 4x4 gauze  - f/u with Urology outpatient     2. HIV disease Reports history of HIV on Biktarvy (reports last viral load undetectable), Home meds (Per med rec): Biktarvy 50/200/25 1tab daily and Bactrim 800/160 1 tab BID. Unclear indication for Bactrim BID, unable to reach the prescribing provider   - c/w home meds         Inpatient treatment course: I&D and IVabx     Patient was discharged to: home    New medications: Keflex, Doxy       Items to follow up as outpatient: Please follow up with urology     Physical exam at the time of discharge:   Constitutional: Resting comfortably in bed; NAD  Head: NC/AT  Eyes: EOMI, clear conjunctiva  ENT: no nasal discharge MMM  Neck: supple;   Respiratory: Normal respiratory effort, CTA B/L; no W/R/R, no retractions  Cardiac: +S1/S2; RRR; no M/R/G;  Gastrointestinal: soft, NT/ND; no rebound or guarding; +BSx4  Extremities: WWP, no clubbing or cyanosis; no peripheral edema  Dermatologic: skin warm, dry and intact; no rashes, wounds, or scars on exposed skin. Scrotal and intergluteal lesions noted with packing and mild blood noted, contained within the packing, No purulence noted   Neurologic: AAOx3; moves all extremities freely   Psychiatric: affect and characteristics of appearance, verbalizations, behaviors are appropriate      LABS & STUDIES:

## 2023-11-07 NOTE — DISCHARGE NOTE PROVIDER - NSDCMRMEDTOKEN_GEN_ALL_CORE_FT
Bactrim  mg-160 mg oral tablet: 1 tab(s) orally 2 times a day  Biktarvy 50 mg-200 mg-25 mg oral tablet: 1 tab(s) orally once a day   Bactrim  mg-160 mg oral tablet: 1 tab(s) orally 2 times a day  Biktarvy 50 mg-200 mg-25 mg oral tablet: 1 tab(s) orally once a day  cephalexin 500 mg oral capsule: 1 cap(s) orally every 6 hours meds to bed  Zyvox 600 mg oral tablet: 1 tab(s) orally every 12 hours meds to bed   Bactrim  mg-160 mg oral tablet: 1 tab(s) orally 2 times a day  Biktarvy 50 mg-200 mg-25 mg oral tablet: 1 tab(s) orally once a day  cephalexin 500 mg oral capsule: 1 cap(s) orally every 6 hours meds to bed  sulfamethoxazole-trimethoprim 800 mg-160 mg oral tablet: 1 tab(s) orally 2 times a day meds to bed   Bactrim  mg-160 mg oral tablet: 1 tab(s) orally 2 times a day  Biktarvy 50 mg-200 mg-25 mg oral tablet: 1 tab(s) orally once a day  cephalexin 500 mg oral capsule: 1 cap(s) orally every 6 hours meds to bed  doxycycline hyclate 100 mg oral capsule: 1 cap(s) orally 2 times a day M2B  sulfamethoxazole-trimethoprim 800 mg-160 mg oral tablet: 1 tab(s) orally 2 times a day meds to bed

## 2023-11-07 NOTE — PROGRESS NOTE ADULT - PROBLEM SELECTOR PLAN 2
Reports history of HIV on Biktarvy (reports last viral load undetectable), Home meds (Per med rec): Biktarvy 50/200/25 1tab daily and Bactrim 800/160 1 tab BID. Unclear indication for Bactrim BID, unable to reach the prescribing provider    Plan:   -f/u viral load, CD4 count  -continue with Bictarvy 1 tab daily   -continue with Bactrim 1 tab daily   -obtain collateral from providing provider

## 2023-11-07 NOTE — PROGRESS NOTE ADULT - ASSESSMENT
33 year old male with history of HIV on Biktarvy (reports last viral load undetectable) presenting as transfer from [facility] ED for scrotal/intergluteal abscesses. States for past week he has had severe pain and purulent drainage from perianal region as well as R scrotum--denies fevers or chills. Sexually active with men and women, engages in receptive anal intercourse. Per pt, he was seen at [facility] last week, found to have scrotal lesion and sent home with outpatient  follow up which he was unable to do.      resident performed bedside scrotal incision and drainage using sterile technique. Scrotal wound cx sent x 2. packed and left open  Pt is afebrile, hemodynamically stable. Labs show no leukocytosis, Cr 1.02, UA neg nit/neg leukocyte esterase. Scrotal abscess growing rare gram positive in pairs and chains/clusters. Scrotal abscess rare gram positive cocci.     Plan:  - will provide daily wound care  -Cont abx per primary team  -Surgery recs for gluteal abscess per general sx  -f/u Wound cx x 2  -IV antibx per primary team  -will need outpatient follow up in  resident clinic within 1 week of discharge   -Discussed with attending

## 2023-11-07 NOTE — DISCHARGE NOTE PROVIDER - ATTENDING DISCHARGE PHYSICAL EXAMINATION:
Constitutional: Resting comfortably in bed; NAD  Head: NC/AT  Eyes: EOMI, clear conjunctiva  ENT: no nasal discharge MMM  Neck: supple;   Respiratory: Normal respiratory effort, CTA B/L; no W/R/R, no retractions  Cardiac: +S1/S2; RRR; no M/R/G;  Gastrointestinal: soft, NT/ND; no rebound or guarding; +BSx4  Extremities: WWP, no clubbing or cyanosis; no peripheral edema  Dermatologic: skin warm, dry and intact; no rashes, wounds, or scars on exposed skin. Scrotal and intergluteal lesions noted with packing and mild blood noted, contained within the packing, No purulence noted   Neurologic: AAOx3; moves all extremities freely   Psychiatric: affect and characteristics of appearance, verbalizations, behaviors are appropriate

## 2023-11-07 NOTE — DISCHARGE NOTE PROVIDER - NSDCFUSCHEDAPPT_GEN_ALL_CORE_FT
[facility] Physician [facility]  UROLOGY [address]  Scheduled Appointment: 11/10/2023

## 2023-11-07 NOTE — DISCHARGE NOTE PROVIDER - PROVIDER TOKENS
FREE:[LAST:[UROLOGY CLINIC],PHONE:[(   )    -],FAX:[(   )    -],ADDRESS:[[address] [place] [address] UROLOGY CLINIC],SCHEDULEDAPPT:[11/10/2023],SCHEDULEDAPPTTIME:[12:00 AM]]

## 2023-11-07 NOTE — PROGRESS NOTE ADULT - ATTENDING COMMENTS
33 year old male with history of HIV on Biktarvy (reports last viral load undetectable) presenting as transfer from [facility] ED with 1 week hx of pain and drainage in the groin area found to have scrotal/intergluteal abscesses admitted for I&D and treatment with IV antibiotics     Labs and imaging reviewed    Problem List  #Scrotal/Gluteal Abscess  #MRSA Infection  #HIV    Plan:   -Can transition to PO regimen of Doxy and Keflex  -Instructions for wound care to be provided to patient  -Continue Home Biktarvy    Rest as above

## 2023-11-07 NOTE — PROGRESS NOTE ADULT - PROBLEM SELECTOR PLAN 1
#Scrotal/intergluteal abscess   -Transferred from [facility] ED for scrotal/intergluteal abscesses.   -Reports severe pain, purulent drainage from perianal region as well as R scrotum for the past week.  Recently seen at [facility] last week, found to have scrotal lesion and sent home with outpatient  follow up which he was unable to do. Sexually active with men and women, engages in receptive anal intercourse. Denies recent sexual activity, trauma to the groin   -In the ED: Aferbile, no leukocytosis, UA negative for infection, non-toxic appearing. CT of pelvis shows:  Bilateral intergluteal fold abscesses. Additional finding of Right scrotal wall abscess, No soft tissue gas.   -s/p IV ctx/flagyl (at [facility]), bedside I&D performed for scrotal and gluteal abscesses by urology/surgery. As per surgery: No further general surgery procedure planned   Rare gram (+) cocci in clusters, chains and  pairs   Plan:   -Ciprofloxacin 400mg IV BID (to cover gram-negative)  - dc'd Metronidazole 500mg IV q8hr as gram (+) cocci in cluster  -Vancomycin 15mg/kg 1000mg IV BID (to cover for MRSA given purulence)   -Pain control with Tylenol 650mg P.O PRN for mild and moderate pain, Ibuprofen 600mg P.O q6 for severe pain  -f/u blood cultures  -f/u wound cultures  -Flores baths  -f/u surgery  and urology recs   -f/u ESR/CRP

## 2023-11-07 NOTE — PROGRESS NOTE ADULT - ASSESSMENT
33M w/ HIV on Biktarvy (reportedly undetectable VL) now s/p I&D of R gluteal abscess, pt noted to also have spontaneously draining L gluteal abscess so drainage was deferred.    No further general surgery procedure planned  Continue daily gentle dry wound packing with gauze  F/u cultures to rule out possible MRSA infection   Continue IV abx per primary   Team 4c to follow, please do not hesitate to call if any further questions or concerns

## 2023-11-07 NOTE — DISCHARGE NOTE PROVIDER - CARE PROVIDER_API CALL
UROLOGY CLINIC,   [address] [place] [address] UROLOGY CLINIC  Phone: (   )    -  Fax: (   )    -  Scheduled Appointment: 11/10/2023 12:00 AM

## 2023-11-07 NOTE — PROGRESS NOTE ADULT - PROBLEM SELECTOR PLAN 3
F: None  Replete: Mg<2, Ph<3, K<4  Diet: Regular  Code status: Full Code  Dispo: [facility]

## 2023-11-08 ENCOUNTER — TRANSCRIPTION ENCOUNTER (OUTPATIENT)
Age: 33
End: 2023-11-08

## 2023-11-08 VITALS
HEART RATE: 72 BPM | OXYGEN SATURATION: 100 % | SYSTOLIC BLOOD PRESSURE: 139 MMHG | RESPIRATION RATE: 16 BRPM | DIASTOLIC BLOOD PRESSURE: 87 MMHG | TEMPERATURE: 98 F

## 2023-11-08 LAB
-  CLINDAMYCIN: SIGNIFICANT CHANGE UP
-  ERYTHROMYCIN: SIGNIFICANT CHANGE UP
-  LINEZOLID: SIGNIFICANT CHANGE UP
-  OXACILLIN: SIGNIFICANT CHANGE UP
-  RIFAMPIN: SIGNIFICANT CHANGE UP
-  TRIMETHOPRIM/SULFAMETHOXAZOLE: SIGNIFICANT CHANGE UP
-  VANCOMYCIN: SIGNIFICANT CHANGE UP
ALBUMIN SERPL ELPH-MCNC: 3.4 G/DL — SIGNIFICANT CHANGE UP (ref 3.3–5)
ALBUMIN SERPL ELPH-MCNC: 3.4 G/DL — SIGNIFICANT CHANGE UP (ref 3.3–5)
ALP SERPL-CCNC: 57 U/L — SIGNIFICANT CHANGE UP (ref 40–120)
ALP SERPL-CCNC: 57 U/L — SIGNIFICANT CHANGE UP (ref 40–120)
ALT FLD-CCNC: 10 U/L — SIGNIFICANT CHANGE UP (ref 10–45)
ALT FLD-CCNC: 10 U/L — SIGNIFICANT CHANGE UP (ref 10–45)
ANION GAP SERPL CALC-SCNC: 9 MMOL/L — SIGNIFICANT CHANGE UP (ref 5–17)
ANION GAP SERPL CALC-SCNC: 9 MMOL/L — SIGNIFICANT CHANGE UP (ref 5–17)
AST SERPL-CCNC: 14 U/L — SIGNIFICANT CHANGE UP (ref 10–40)
AST SERPL-CCNC: 14 U/L — SIGNIFICANT CHANGE UP (ref 10–40)
BILIRUB SERPL-MCNC: 0.2 MG/DL — SIGNIFICANT CHANGE UP (ref 0.2–1.2)
BILIRUB SERPL-MCNC: 0.2 MG/DL — SIGNIFICANT CHANGE UP (ref 0.2–1.2)
BUN SERPL-MCNC: 9 MG/DL — SIGNIFICANT CHANGE UP (ref 7–23)
BUN SERPL-MCNC: 9 MG/DL — SIGNIFICANT CHANGE UP (ref 7–23)
CALCIUM SERPL-MCNC: 8.8 MG/DL — SIGNIFICANT CHANGE UP (ref 8.4–10.5)
CALCIUM SERPL-MCNC: 8.8 MG/DL — SIGNIFICANT CHANGE UP (ref 8.4–10.5)
CHLORIDE SERPL-SCNC: 103 MMOL/L — SIGNIFICANT CHANGE UP (ref 96–108)
CHLORIDE SERPL-SCNC: 103 MMOL/L — SIGNIFICANT CHANGE UP (ref 96–108)
CO2 SERPL-SCNC: 26 MMOL/L — SIGNIFICANT CHANGE UP (ref 22–31)
CO2 SERPL-SCNC: 26 MMOL/L — SIGNIFICANT CHANGE UP (ref 22–31)
CREAT SERPL-MCNC: 0.92 MG/DL — SIGNIFICANT CHANGE UP (ref 0.5–1.3)
CREAT SERPL-MCNC: 0.92 MG/DL — SIGNIFICANT CHANGE UP (ref 0.5–1.3)
CULTURE RESULTS: ABNORMAL
CULTURE RESULTS: ABNORMAL
EGFR: 114 ML/MIN/1.73M2 — SIGNIFICANT CHANGE UP
EGFR: 114 ML/MIN/1.73M2 — SIGNIFICANT CHANGE UP
GLUCOSE SERPL-MCNC: 102 MG/DL — HIGH (ref 70–99)
GLUCOSE SERPL-MCNC: 102 MG/DL — HIGH (ref 70–99)
HCT VFR BLD CALC: 36.9 % — LOW (ref 39–50)
HCT VFR BLD CALC: 36.9 % — LOW (ref 39–50)
HGB BLD-MCNC: 12.5 G/DL — LOW (ref 13–17)
HGB BLD-MCNC: 12.5 G/DL — LOW (ref 13–17)
MCHC RBC-ENTMCNC: 31.5 PG — SIGNIFICANT CHANGE UP (ref 27–34)
MCHC RBC-ENTMCNC: 31.5 PG — SIGNIFICANT CHANGE UP (ref 27–34)
MCHC RBC-ENTMCNC: 33.9 GM/DL — SIGNIFICANT CHANGE UP (ref 32–36)
MCHC RBC-ENTMCNC: 33.9 GM/DL — SIGNIFICANT CHANGE UP (ref 32–36)
MCV RBC AUTO: 92.9 FL — SIGNIFICANT CHANGE UP (ref 80–100)
MCV RBC AUTO: 92.9 FL — SIGNIFICANT CHANGE UP (ref 80–100)
METHOD TYPE: SIGNIFICANT CHANGE UP
NRBC # BLD: 0 /100 WBCS — SIGNIFICANT CHANGE UP (ref 0–0)
NRBC # BLD: 0 /100 WBCS — SIGNIFICANT CHANGE UP (ref 0–0)
ORGANISM # SPEC MICROSCOPIC CNT: ABNORMAL
ORGANISM # SPEC MICROSCOPIC CNT: SIGNIFICANT CHANGE UP
ORGANISM # SPEC MICROSCOPIC CNT: SIGNIFICANT CHANGE UP
PLATELET # BLD AUTO: 263 K/UL — SIGNIFICANT CHANGE UP (ref 150–400)
PLATELET # BLD AUTO: 263 K/UL — SIGNIFICANT CHANGE UP (ref 150–400)
POTASSIUM SERPL-MCNC: 3.6 MMOL/L — SIGNIFICANT CHANGE UP (ref 3.5–5.3)
POTASSIUM SERPL-MCNC: 3.6 MMOL/L — SIGNIFICANT CHANGE UP (ref 3.5–5.3)
POTASSIUM SERPL-SCNC: 3.6 MMOL/L — SIGNIFICANT CHANGE UP (ref 3.5–5.3)
POTASSIUM SERPL-SCNC: 3.6 MMOL/L — SIGNIFICANT CHANGE UP (ref 3.5–5.3)
PROT SERPL-MCNC: 6.6 G/DL — SIGNIFICANT CHANGE UP (ref 6–8.3)
PROT SERPL-MCNC: 6.6 G/DL — SIGNIFICANT CHANGE UP (ref 6–8.3)
RBC # BLD: 3.97 M/UL — LOW (ref 4.2–5.8)
RBC # BLD: 3.97 M/UL — LOW (ref 4.2–5.8)
RBC # FLD: 12.4 % — SIGNIFICANT CHANGE UP (ref 10.3–14.5)
RBC # FLD: 12.4 % — SIGNIFICANT CHANGE UP (ref 10.3–14.5)
SODIUM SERPL-SCNC: 138 MMOL/L — SIGNIFICANT CHANGE UP (ref 135–145)
SODIUM SERPL-SCNC: 138 MMOL/L — SIGNIFICANT CHANGE UP (ref 135–145)
SPECIMEN SOURCE: SIGNIFICANT CHANGE UP
SPECIMEN SOURCE: SIGNIFICANT CHANGE UP
VANCOMYCIN TROUGH SERPL-MCNC: 4.9 UG/ML — LOW (ref 10–20)
VANCOMYCIN TROUGH SERPL-MCNC: 4.9 UG/ML — LOW (ref 10–20)
VANCOMYCIN TROUGH SERPL-MCNC: 5.6 UG/ML — LOW (ref 10–20)
VANCOMYCIN TROUGH SERPL-MCNC: 5.6 UG/ML — LOW (ref 10–20)
WBC # BLD: 6.3 K/UL — SIGNIFICANT CHANGE UP (ref 3.8–10.5)
WBC # BLD: 6.3 K/UL — SIGNIFICANT CHANGE UP (ref 3.8–10.5)
WBC # FLD AUTO: 6.3 K/UL — SIGNIFICANT CHANGE UP (ref 3.8–10.5)
WBC # FLD AUTO: 6.3 K/UL — SIGNIFICANT CHANGE UP (ref 3.8–10.5)

## 2023-11-08 PROCEDURE — 80048 BASIC METABOLIC PNL TOTAL CA: CPT

## 2023-11-08 PROCEDURE — 86901 BLOOD TYPING SEROLOGIC RH(D): CPT

## 2023-11-08 PROCEDURE — 87186 SC STD MICRODIL/AGAR DIL: CPT

## 2023-11-08 PROCEDURE — 85025 COMPLETE CBC W/AUTO DIFF WBC: CPT

## 2023-11-08 PROCEDURE — 87205 SMEAR GRAM STAIN: CPT

## 2023-11-08 PROCEDURE — 87070 CULTURE OTHR SPECIMN AEROBIC: CPT

## 2023-11-08 PROCEDURE — 86360 T CELL ABSOLUTE COUNT/RATIO: CPT

## 2023-11-08 PROCEDURE — 80202 ASSAY OF VANCOMYCIN: CPT

## 2023-11-08 PROCEDURE — 86140 C-REACTIVE PROTEIN: CPT

## 2023-11-08 PROCEDURE — 87536 HIV-1 QUANT&REVRSE TRNSCRPJ: CPT

## 2023-11-08 PROCEDURE — 87181 SC STD AGAR DILUTION PER AGT: CPT

## 2023-11-08 PROCEDURE — 85730 THROMBOPLASTIN TIME PARTIAL: CPT

## 2023-11-08 PROCEDURE — 86359 T CELLS TOTAL COUNT: CPT

## 2023-11-08 PROCEDURE — 80053 COMPREHEN METABOLIC PANEL: CPT

## 2023-11-08 PROCEDURE — 86850 RBC ANTIBODY SCREEN: CPT

## 2023-11-08 PROCEDURE — 81003 URINALYSIS AUTO W/O SCOPE: CPT

## 2023-11-08 PROCEDURE — 96374 THER/PROPH/DIAG INJ IV PUSH: CPT

## 2023-11-08 PROCEDURE — 86357 NK CELLS TOTAL COUNT: CPT

## 2023-11-08 PROCEDURE — 36415 COLL VENOUS BLD VENIPUNCTURE: CPT

## 2023-11-08 PROCEDURE — 86780 TREPONEMA PALLIDUM: CPT

## 2023-11-08 PROCEDURE — 83605 ASSAY OF LACTIC ACID: CPT

## 2023-11-08 PROCEDURE — 99239 HOSP IP/OBS DSCHRG MGMT >30: CPT | Mod: GC

## 2023-11-08 PROCEDURE — 86355 B CELLS TOTAL COUNT: CPT

## 2023-11-08 PROCEDURE — 85610 PROTHROMBIN TIME: CPT

## 2023-11-08 PROCEDURE — 85652 RBC SED RATE AUTOMATED: CPT

## 2023-11-08 PROCEDURE — 85027 COMPLETE CBC AUTOMATED: CPT

## 2023-11-08 PROCEDURE — 93005 ELECTROCARDIOGRAM TRACING: CPT

## 2023-11-08 PROCEDURE — 83735 ASSAY OF MAGNESIUM: CPT

## 2023-11-08 PROCEDURE — 86900 BLOOD TYPING SEROLOGIC ABO: CPT

## 2023-11-08 PROCEDURE — 99285 EMERGENCY DEPT VISIT HI MDM: CPT | Mod: 25

## 2023-11-08 RX ORDER — VANCOMYCIN HCL 1 G
1250 VIAL (EA) INTRAVENOUS EVERY 12 HOURS
Refills: 0 | Status: DISCONTINUED | OUTPATIENT
Start: 2023-11-08 | End: 2023-11-08

## 2023-11-08 RX ADMIN — Medication 400 MILLIGRAM(S): at 04:06

## 2023-11-08 RX ADMIN — Medication 400 MILLIGRAM(S): at 05:06

## 2023-11-08 RX ADMIN — Medication 200 MILLIGRAM(S): at 05:31

## 2023-11-08 RX ADMIN — Medication 166.67 MILLIGRAM(S): at 09:31

## 2023-11-08 NOTE — DISCHARGE NOTE NURSING/CASE MANAGEMENT/SOCIAL WORK - PATIENT PORTAL LINK FT
You can access the FollowMyHealth Patient Portal offered by [facility] by registering at the following website: [email or web address]. By joining [facility]’s FollowMyHealth portal, you will also be able to view your health information using other applications (apps) compatible with our system.

## 2023-11-08 NOTE — PROGRESS NOTE ADULT - SUBJECTIVE AND OBJECTIVE BOX
SUBJECTIVE:  NAEON. Much improved pain and tenderness over abscesses. Overall feels much better today. Tolerating diet.    MEDICATIONS  (STANDING):  bictegravir 50 mG/emtricitabine 200 mG/tenofovir alafenamide 25 mG (BIKTARVY) 1 Tablet(s) Oral every 24 hours  ciprofloxacin   IVPB 400 milliGRAM(s) IV Intermittent every 12 hours  influenza   Vaccine 0.5 milliLiter(s) IntraMuscular once  trimethoprim  160 mG/sulfamethoxazole 800 mG 1 Tablet(s) Oral every 24 hours  vancomycin  IVPB 1250 milliGRAM(s) IV Intermittent every 12 hours    MEDICATIONS  (PRN):  acetaminophen     Tablet .. 650 milliGRAM(s) Oral every 6 hours PRN Mild Pain (1 - 3), Moderate Pain (4 - 6)  ibuprofen  Tablet. 400 milliGRAM(s) Oral every 6 hours PRN Severe Pain (7 - 10)      Vital Signs Last 24 Hrs  T(C): 37.1 (08 Nov 2023 05:46), Max: 37.4 (07 Nov 2023 21:48)  T(F): 98.7 (08 Nov 2023 05:46), Max: 99.4 (07 Nov 2023 21:48)  HR: 79 (08 Nov 2023 05:46) (70 - 79)  BP: 134/93 (08 Nov 2023 05:46) (127/75 - 134/93)  BP(mean): --  RR: 16 (08 Nov 2023 05:46) (16 - 17)  SpO2: 100% (08 Nov 2023 05:46) (99% - 100%)    Parameters below as of 08 Nov 2023 05:46  Patient On (Oxygen Delivery Method): room air        Physical Exam:  General: NAD, resting comfortably in bed  Pulmonary: Nonlabored breathing, no respiratory distress  Cardiovascular: NSR  Abdominal: soft, NT/ND, gluteal abscess open and minimal serosang output on dressing  Extremities: WWP, normal strength  Neuro: A/O x 3, CNs II-XII grossly intact, no focal deficits    I&O's Summary      LABS:                        12.5   6.30  )-----------( 263      ( 08 Nov 2023 05:30 )             36.9     11-08    138  |  103  |  9   ----------------------------<  102<H>  3.6   |  26  |  0.92    Ca    8.8      08 Nov 2023 05:30  Mg     1.8     11-07    TPro  6.6  /  Alb  3.4  /  TBili  0.2  /  DBili  x   /  AST  14  /  ALT  10  /  AlkPhos  57  11-08      Urinalysis Basic - ( 08 Nov 2023 05:30 )    Color: x / Appearance: x / SG: x / pH: x  Gluc: 102 mg/dL / Ketone: x  / Bili: x / Urobili: x   Blood: x / Protein: x / Nitrite: x   Leuk Esterase: x / RBC: x / WBC x   Sq Epi: x / Non Sq Epi: x / Bacteria: x    
    SUBJECTIVE: Seen and evaluated in am. Resting comfortaly, NAEON. Denies any dysuria, urgency, fever, chills, testicular pain. Endorses mild scrotal wound pain that is controlled.       MEDICATIONS  (STANDING):  bictegravir 50 mG/emtricitabine 200 mG/tenofovir alafenamide 25 mG (BIKTARVY) 1 Tablet(s) Oral every 24 hours  ciprofloxacin   IVPB 400 milliGRAM(s) IV Intermittent every 12 hours  influenza   Vaccine 0.5 milliLiter(s) IntraMuscular once  metroNIDAZOLE  IVPB      metroNIDAZOLE  IVPB 500 milliGRAM(s) IV Intermittent every 8 hours  trimethoprim  160 mG/sulfamethoxazole 800 mG 1 Tablet(s) Oral every 24 hours  vancomycin  IVPB 1000 milliGRAM(s) IV Intermittent every 12 hours    MEDICATIONS  (PRN):  acetaminophen     Tablet .. 650 milliGRAM(s) Oral every 6 hours PRN Mild Pain (1 - 3), Moderate Pain (4 - 6)  ibuprofen  Tablet. 400 milliGRAM(s) Oral every 6 hours PRN Severe Pain (7 - 10)      Vital Signs Last 24 Hrs  T(C): 37.1 (07 Nov 2023 05:44), Max: 37.1 (06 Nov 2023 20:15)  T(F): 98.8 (07 Nov 2023 05:44), Max: 98.8 (06 Nov 2023 20:15)  HR: 75 (07 Nov 2023 05:44) (75 - 93)  BP: 120/84 (07 Nov 2023 05:44) (120/84 - 131/90)  BP(mean): 90 (06 Nov 2023 15:26) (90 - 90)  RR: 16 (07 Nov 2023 05:44) (16 - 19)  SpO2: 99% (07 Nov 2023 05:44) (99% - 100%)    Parameters below as of 07 Nov 2023 05:44  Patient On (Oxygen Delivery Method): room air    PE  Gen: awake and alert  Abd: nontender, nondistended  : no suprapubic/CVAT  R scrotum wound packed, no drainage, mildly tender to palpatation, well healing with healthy granulation tissue, dressing was changed     I&O's Summary      LABS:                        12.0   6.99  )-----------( 260      ( 07 Nov 2023 07:46 )             36.6     11-07    140  |  108  |  11  ----------------------------<  101<H>  3.8   |  20<L>  |  1.02    Ca    8.6      07 Nov 2023 07:46  Mg     1.8     11-07    TPro  7.8  /  Alb  3.5  /  TBili  0.1<L>  /  DBili  <0.1  /  AST  22  /  ALT  18  /  AlkPhos  65  11-06    PT/INR - ( 06 Nov 2023 10:09 )   PT: 12.2 sec;   INR: 1.07          PTT - ( 06 Nov 2023 10:09 )  PTT:31.3 sec  Urinalysis Basic - ( 07 Nov 2023 07:46 )    Color: x / Appearance: x / SG: x / pH: x  Gluc: 101 mg/dL / Ketone: x  / Bili: x / Urobili: x   Blood: x / Protein: x / Nitrite: x   Leuk Esterase: x / RBC: x / WBC x   Sq Epi: x / Non Sq Epi: x / Bacteria: x      CAPILLARY BLOOD GLUCOSE        LIVER FUNCTIONS - ( 06 Nov 2023 05:35 )  Alb: 3.5 g/dL / Pro: 7.8 g/dL / ALK PHOS: 65 U/L / ALT: 18 U/L / AST: 22 U/L / GGT: x             RADIOLOGY & ADDITIONAL STUDIES:  
SUBJECTIVE:  Doing well this AM. NAEON. Pain is well controlled. Denies significant drainage from I&D sites. Ambulating as tolerated. Tolerating diet.    MEDICATIONS  (STANDING):  bictegravir 50 mG/emtricitabine 200 mG/tenofovir alafenamide 25 mG (BIKTARVY) 1 Tablet(s) Oral every 24 hours  ciprofloxacin   IVPB 400 milliGRAM(s) IV Intermittent every 12 hours  influenza   Vaccine 0.5 milliLiter(s) IntraMuscular once  metroNIDAZOLE  IVPB      metroNIDAZOLE  IVPB 500 milliGRAM(s) IV Intermittent every 8 hours  trimethoprim  160 mG/sulfamethoxazole 800 mG 1 Tablet(s) Oral every 24 hours  vancomycin  IVPB 1000 milliGRAM(s) IV Intermittent every 12 hours    MEDICATIONS  (PRN):  acetaminophen     Tablet .. 650 milliGRAM(s) Oral every 6 hours PRN Mild Pain (1 - 3), Moderate Pain (4 - 6)  ibuprofen  Tablet. 400 milliGRAM(s) Oral every 6 hours PRN Severe Pain (7 - 10)      Vital Signs Last 24 Hrs  T(C): 37.1 (07 Nov 2023 05:44), Max: 37.2 (06 Nov 2023 09:05)  T(F): 98.8 (07 Nov 2023 05:44), Max: 98.9 (06 Nov 2023 09:05)  HR: 75 (07 Nov 2023 05:44) (70 - 93)  BP: 120/84 (07 Nov 2023 05:44) (107/59 - 131/90)  BP(mean): 90 (06 Nov 2023 15:26) (90 - 90)  RR: 16 (07 Nov 2023 05:44) (16 - 19)  SpO2: 99% (07 Nov 2023 05:44) (97% - 100%)    Parameters below as of 07 Nov 2023 05:44  Patient On (Oxygen Delivery Method): room air        Physical Exam:  General: NAD, resting comfortably in bed  Pulmonary: Nonlabored breathing, no respiratory distress  Cardiovascular: NSR  Abdominal: soft, NT/ND, serosang drainage on wound packing  Extremities: WWP, normal strength  Neuro: A/O x 3, CNs II-XII grossly intact, no focal deficits    I&O's Summary      LABS:                        12.0   6.99  )-----------( 260      ( 07 Nov 2023 07:46 )             36.6     11-06    145  |  105  |  9   ----------------------------<  95  3.4<L>   |  31  |  1.06    Ca    9.2      06 Nov 2023 05:35    TPro  7.8  /  Alb  3.5  /  TBili  0.1<L>  /  DBili  <0.1  /  AST  22  /  ALT  18  /  AlkPhos  65  11-06    PT/INR - ( 06 Nov 2023 10:09 )   PT: 12.2 sec;   INR: 1.07          PTT - ( 06 Nov 2023 10:09 )  PTT:31.3 sec  Urinalysis Basic - ( 06 Nov 2023 10:36 )    Color: Yellow / Appearance: Clear / SG: >1.030 / pH: x  Gluc: x / Ketone: Negative mg/dL  / Bili: Negative / Urobili: 0.2 mg/dL   Blood: x / Protein: Trace mg/dL / Nitrite: Negative   Leuk Esterase: Negative / RBC: x / WBC x   Sq Epi: x / Non Sq Epi: x / Bacteria: x    
INTERVAL HPI/OVERNIGHT EVENTS:  Patient was seen and examined at bedside. As per nurse and patient, no o/n events, patient resting comfortably. No complaints at this time. Patient denies: fever, chills, lightheadedness, weakness, CP, palpitations, SOB, cough, N/V. ROS otherwise negative.    VITAL SIGNS:  T(F): 98.5 (11-07-23 @ 12:04)  HR: 77 (11-07-23 @ 12:04)  BP: 130/84 (11-07-23 @ 12:04)  RR: 16 (11-07-23 @ 12:04)  SpO2: 100% (11-07-23 @ 12:04)  Wt(kg): --        PHYSICAL EXAM:    General: NAD, resting comfortably in bed  Pulmonary: Nonlabored breathing, no respiratory distress  Cardiovascular: NSR  Abdominal: soft, NT/ND, serosang drainage on wound packing  Extremities: WWP, normal strength  Neuro: A/O x 3, CNs II-XII grossly intact, no focal deficits    MEDICATIONS  (STANDING):  bictegravir 50 mG/emtricitabine 200 mG/tenofovir alafenamide 25 mG (BIKTARVY) 1 Tablet(s) Oral every 24 hours  ciprofloxacin   IVPB 400 milliGRAM(s) IV Intermittent every 12 hours  influenza   Vaccine 0.5 milliLiter(s) IntraMuscular once  trimethoprim  160 mG/sulfamethoxazole 800 mG 1 Tablet(s) Oral every 24 hours  vancomycin  IVPB 1000 milliGRAM(s) IV Intermittent every 12 hours    MEDICATIONS  (PRN):  acetaminophen     Tablet .. 650 milliGRAM(s) Oral every 6 hours PRN Mild Pain (1 - 3), Moderate Pain (4 - 6)  ibuprofen  Tablet. 400 milliGRAM(s) Oral every 6 hours PRN Severe Pain (7 - 10)      Allergies    No Known Allergies    Intolerances        LABS:                        12.0   6.99  )-----------( 260      ( 07 Nov 2023 07:46 )             36.6     11-07    140  |  108  |  11  ----------------------------<  101<H>  3.8   |  20<L>  |  1.02    Ca    8.6      07 Nov 2023 07:46  Mg     1.8     11-07    TPro  7.8  /  Alb  3.5  /  TBili  0.1<L>  /  DBili  <0.1  /  AST  22  /  ALT  18  /  AlkPhos  65  11-06    PT/INR - ( 06 Nov 2023 10:09 )   PT: 12.2 sec;   INR: 1.07          PTT - ( 06 Nov 2023 10:09 )  PTT:31.3 sec  Urinalysis Basic - ( 07 Nov 2023 07:46 )    Color: x / Appearance: x / SG: x / pH: x  Gluc: 101 mg/dL / Ketone: x  / Bili: x / Urobili: x   Blood: x / Protein: x / Nitrite: x   Leuk Esterase: x / RBC: x / WBC x   Sq Epi: x / Non Sq Epi: x / Bacteria: x        RADIOLOGY & ADDITIONAL TESTS:  Reviewed

## 2023-11-08 NOTE — DISCHARGE NOTE NURSING/CASE MANAGEMENT/SOCIAL WORK - [id number]_GEN_ALL_CORE
For information on Fall & Injury Prevention, visit: [email or web address] OR  [email or web address] OR  https://www.cdc.gov/steadi/patient.html

## 2023-11-08 NOTE — PROGRESS NOTE ADULT - ASSESSMENT
33M w/ HIV on Biktarvy (reportedly undetectable VL) now s/p I&D of R gluteal abscess, pt noted to also have spontaneously draining L gluteal abscess so drainage was deferred.    No further general surgery procedure planned  Continue daily gentle dry wound packing with gauze  Surgery team 4c will sign off at this time. Please reconsult as necessary for clinical changes or concerns.

## 2023-11-10 ENCOUNTER — APPOINTMENT (OUTPATIENT)
Dept: UROLOGY | Facility: CLINIC | Age: 33
End: 2023-11-10

## 2023-11-10 LAB
-  AMPICILLIN: SIGNIFICANT CHANGE UP
-  AMPICILLIN: SIGNIFICANT CHANGE UP
-  VANCOMYCIN: SIGNIFICANT CHANGE UP
-  VANCOMYCIN: SIGNIFICANT CHANGE UP
CULTURE RESULTS: ABNORMAL
CULTURE RESULTS: ABNORMAL
METHOD TYPE: SIGNIFICANT CHANGE UP
METHOD TYPE: SIGNIFICANT CHANGE UP
ORGANISM # SPEC MICROSCOPIC CNT: ABNORMAL
ORGANISM # SPEC MICROSCOPIC CNT: SIGNIFICANT CHANGE UP
ORGANISM # SPEC MICROSCOPIC CNT: SIGNIFICANT CHANGE UP
SPECIMEN SOURCE: SIGNIFICANT CHANGE UP
SPECIMEN SOURCE: SIGNIFICANT CHANGE UP

## 2023-11-11 LAB
CULTURE RESULTS: SIGNIFICANT CHANGE UP
SPECIMEN SOURCE: SIGNIFICANT CHANGE UP

## 2023-11-12 DIAGNOSIS — Z79.899 OTHER LONG TERM (CURRENT) DRUG THERAPY: ICD-10-CM

## 2023-11-12 DIAGNOSIS — F12.90 CANNABIS USE, UNSPECIFIED, UNCOMPLICATED: ICD-10-CM

## 2023-11-12 DIAGNOSIS — F17.210 NICOTINE DEPENDENCE, CIGARETTES, UNCOMPLICATED: ICD-10-CM

## 2023-11-12 DIAGNOSIS — L02.31 CUTANEOUS ABSCESS OF BUTTOCK: ICD-10-CM

## 2023-11-12 DIAGNOSIS — B95.62 METHICILLIN RESISTANT STAPHYLOCOCCUS AUREUS INFECTION AS THE CAUSE OF DISEASES CLASSIFIED ELSEWHERE: ICD-10-CM

## 2023-11-12 DIAGNOSIS — N49.2 INFLAMMATORY DISORDERS OF SCROTUM: ICD-10-CM

## 2023-11-12 DIAGNOSIS — B96.89 OTHER SPECIFIED BACTERIAL AGENTS AS THE CAUSE OF DISEASES CLASSIFIED ELSEWHERE: ICD-10-CM

## 2023-11-12 DIAGNOSIS — B20 HUMAN IMMUNODEFICIENCY VIRUS [HIV] DISEASE: ICD-10-CM

## 2023-12-01 ENCOUNTER — APPOINTMENT (OUTPATIENT)
Dept: UROLOGY | Facility: CLINIC | Age: 33
End: 2023-12-01